# Patient Record
Sex: FEMALE | Race: WHITE | NOT HISPANIC OR LATINO | Employment: FULL TIME | URBAN - METROPOLITAN AREA
[De-identification: names, ages, dates, MRNs, and addresses within clinical notes are randomized per-mention and may not be internally consistent; named-entity substitution may affect disease eponyms.]

---

## 2017-10-05 ENCOUNTER — OFFICE VISIT (OUTPATIENT)
Dept: NEUROLOGY | Facility: CLINIC | Age: 47
End: 2017-10-05

## 2017-10-05 VITALS
OXYGEN SATURATION: 98 % | HEIGHT: 65 IN | BODY MASS INDEX: 35.49 KG/M2 | HEART RATE: 80 BPM | DIASTOLIC BLOOD PRESSURE: 70 MMHG | WEIGHT: 213 LBS | SYSTOLIC BLOOD PRESSURE: 120 MMHG

## 2017-10-05 DIAGNOSIS — R25.1 TREMOR: ICD-10-CM

## 2017-10-05 DIAGNOSIS — G43.C0 PERIODIC HEADACHE SYNDROME, NOT INTRACTABLE: Primary | ICD-10-CM

## 2017-10-05 PROCEDURE — 99245 OFF/OP CONSLTJ NEW/EST HI 55: CPT | Performed by: PSYCHIATRY & NEUROLOGY

## 2017-10-05 RX ORDER — VENLAFAXINE 75 MG/1
TABLET ORAL
Refills: 1 | COMMUNITY
Start: 2017-09-06 | End: 2020-09-28

## 2017-10-05 RX ORDER — GABAPENTIN 300 MG/1
300 CAPSULE ORAL AS NEEDED
COMMUNITY
Start: 2017-10-04 | End: 2020-09-28

## 2017-10-05 RX ORDER — TIZANIDINE 4 MG/1
TABLET ORAL
COMMUNITY
Start: 2017-10-01

## 2017-10-05 RX ORDER — FLUTICASONE PROPIONATE 50 MCG
2 SPRAY, SUSPENSION (ML) NASAL DAILY
COMMUNITY

## 2017-10-05 RX ORDER — TOPIRAMATE 25 MG/1
TABLET ORAL
Qty: 120 TABLET | Refills: 3 | Status: SHIPPED | OUTPATIENT
Start: 2017-10-05 | End: 2020-09-28

## 2017-10-05 RX ORDER — LOSARTAN POTASSIUM 50 MG/1
TABLET ORAL
COMMUNITY
Start: 2017-10-02 | End: 2020-09-28

## 2017-10-05 RX ORDER — LEVONORGESTREL / ETHINYL ESTRADIOL AND ETHINYL ESTRADIOL 150-30(84)
KIT ORAL
COMMUNITY
Start: 2017-10-04

## 2017-10-05 RX ORDER — MECLIZINE HYDROCHLORIDE 25 MG/1
TABLET ORAL
Refills: 1 | COMMUNITY
Start: 2017-08-28 | End: 2020-09-28

## 2017-10-05 RX ORDER — ALBUTEROL SULFATE 90 UG/1
2 AEROSOL, METERED RESPIRATORY (INHALATION) EVERY 4 HOURS PRN
COMMUNITY

## 2017-10-05 RX ORDER — LEVOTHYROXINE SODIUM 0.05 MG/1
TABLET ORAL
Refills: 1 | COMMUNITY
Start: 2017-09-18 | End: 2020-09-29 | Stop reason: SDUPTHER

## 2017-10-05 RX ORDER — ATENOLOL 50 MG/1
TABLET ORAL
Refills: 1 | COMMUNITY
Start: 2017-08-28 | End: 2020-09-28

## 2017-10-05 RX ORDER — CETIRIZINE HYDROCHLORIDE 10 MG/1
10 TABLET ORAL DAILY
COMMUNITY
End: 2020-09-28

## 2017-10-05 RX ORDER — NALTREXONE HYDROCHLORIDE AND BUPROPION HYDROCHLORIDE 8; 90 MG/1; MG/1
TABLET, EXTENDED RELEASE ORAL
Refills: 3 | COMMUNITY
Start: 2017-08-12 | End: 2020-09-28

## 2017-10-05 NOTE — PROGRESS NOTES
Subjective:   .  Chief Complaint   Patient presents with   • Dizziness       Patient ID: Michelle Davies is a 47 y.o. female.    History of Present Illness     47 y.o. woman referred by Monika Villagomez for dizziness, HA and tremors.    HA frequency is daily.  Constantly present located behind eyes.  Quality is pressure and throbbing.  Worse 8/10 and best 4/10.  Sensitive to sound, movement.  Concurrent sx of dizziness and nausea.  Riding in a car will worsen sx.      Tremors in hands with posture.  Right greater than left.  Trouble with writing and holding a heavy glass.  Sx started in last year.     Reviewed Monika Villagomez's records:    MRI Brain, my review of films, 9/12/17 few scattered T2 signal abnl  Labs - CBC, CMP, U/A, TSH, B12, Rf, CRP, WOO, - NCS    The following portions of the patient's history were reviewed and updated as appropriate:   She  has a past medical history of Anxiety; Arthritis; Depression; Hyperlipidemia; Hypertension; and Migraine.  She  does not have any pertinent problems on file.  She  has a past surgical history that includes Reduction mammaplasty; Gastric bypass; Cholecystectomy; and Lumbar disc surgery.  Her family history includes Alcohol abuse in her maternal grandfather; Cancer in her maternal grandmother; Hypertension in her mother.  She  reports that she has never smoked. She has never used smokeless tobacco. She reports that she drinks alcohol. She reports that she does not use illicit drugs.  Current Outpatient Prescriptions   Medication Sig Dispense Refill   • albuterol (PROVENTIL HFA;VENTOLIN HFA) 108 (90 Base) MCG/ACT inhaler Inhale 2 puffs Every 4 (Four) Hours As Needed for Wheezing.     • atenolol (TENORMIN) 50 MG tablet TAKE 1 TABLET BY MOUTH DAILY  1   • cetirizine (zyrTEC) 10 MG tablet Take 10 mg by mouth Daily.     • CONTRAVE 8-90 MG tablet TAKE 2 TABLETS BY MOUTH TWICE A DAY  3   • fluticasone (FLONASE) 50 MCG/ACT nasal spray 2 sprays into each nostril Daily.     • gabapentin  (NEURONTIN) 300 MG capsule Take 300 mg by mouth As Needed.     • Levonorgest-Eth Estrad 91-Day 0.15-0.03 &0.01 MG tablet      • levothyroxine (SYNTHROID, LEVOTHROID) 50 MCG tablet TAKE ONE TABLET BY MOUTH ONCE A DAY  1   • losartan (COZAAR) 50 MG tablet      • meclizine (ANTIVERT) 25 MG tablet TAKE 1 TABLET BY MOUTH 4 TIMES DAILY AS NEEDED FOR VERTIGO  1   • Multiple Vitamins-Minerals (MULTIVITAMIN ADULT PO) Take  by mouth.     • tiZANidine (ZANAFLEX) 4 MG tablet      • venlafaxine (EFFEXOR) 75 MG tablet TAKE 2 TABLETS AT BEDTIME  1     No current facility-administered medications for this visit.      No current outpatient prescriptions on file prior to visit.     No current facility-administered medications on file prior to visit.      She has No Known Allergies..    Review of Systems   Constitutional: Positive for fatigue. Negative for activity change and unexpected weight change.   HENT: Negative for tinnitus and trouble swallowing.    Eyes: Negative for photophobia and visual disturbance.   Respiratory: Negative for apnea, cough and choking.    Cardiovascular: Negative for leg swelling.   Gastrointestinal: Negative for nausea and vomiting.   Endocrine: Negative for cold intolerance and heat intolerance.   Genitourinary: Negative for difficulty urinating, frequency, menstrual problem and urgency.   Musculoskeletal: Positive for arthralgias. Negative for back pain, gait problem, myalgias and neck pain.   Skin: Negative for color change and rash.   Allergic/Immunologic: Negative for immunocompromised state.   Neurological: Positive for dizziness, tremors and headaches. Negative for seizures, syncope, facial asymmetry, speech difficulty, weakness, light-headedness and numbness.   Hematological: Negative for adenopathy. Does not bruise/bleed easily.   Psychiatric/Behavioral: Positive for decreased concentration. Negative for behavioral problems, confusion, hallucinations and sleep disturbance.     "    Objective:  Vitals:    10/05/17 1142   BP: 120/70   Pulse: 80   SpO2: 98%   Weight: 213 lb (96.6 kg)   Height: 65\" (165.1 cm)       Neurologic Exam     Mental Status   Oriented to person, place, and time.   Registration: recalls 3 of 3 objects. Recall at 5 minutes: recalls 3 of 3 objects. Follows 3 step commands.   Attention: normal. Concentration: normal.   Speech: speech is normal   Level of consciousness: alert  Knowledge: good and consistent with education.   Able to name object. Able to read. Able to repeat. Able to write. Normal comprehension.     Cranial Nerves     CN II   Visual fields full to confrontation.   Visual acuity: normal  Right visual field deficit: none  Left visual field deficit: none     CN III, IV, VI   Pupils are equal, round, and reactive to light.  Extraocular motions are normal.   Right pupil: Shape: regular. Reactivity: brisk. Consensual response: intact.   Left pupil: Shape: regular. Reactivity: brisk. Consensual response: intact.   Nystagmus: none   Diplopia: none  Ophthalmoparesis: none  Upgaze: normal  Downgaze: normal  Conjugate gaze: present  Vestibulo-ocular reflex: present    CN V   Facial sensation intact.   Right corneal reflex: normal  Left corneal reflex: normal    CN VII   Right facial weakness: none  Left facial weakness: none    CN VIII   Hearing: intact    CN IX, X   Palate: symmetric  Right gag reflex: normal  Left gag reflex: normal    CN XI   Right sternocleidomastoid strength: normal  Left sternocleidomastoid strength: normal    CN XII   Tongue: not atrophic  Fasciculations: absent  Tongue deviation: none    Motor Exam   Muscle bulk: normal  Overall muscle tone: normal  Right arm tone: normal  Left arm tone: normal  Right leg tone: normal  Left leg tone: normal    Strength   Strength 5/5 throughout.     Sensory Exam   Light touch normal.   Vibration normal.   Proprioception normal.   Pinprick normal.     Gait, Coordination, and Reflexes     Gait  Gait: " normal    Coordination   Romberg: negative  Finger to nose coordination: normal  Heel to shin coordination: normal  Tandem walking coordination: normal    Tremor   Resting tremor: absent  Intention tremor: present  Action tremor: left arm and right arm    Reflexes   Reflexes 2+ except as noted.       Physical Exam   Constitutional: She is oriented to person, place, and time. Vital signs are normal. She appears well-developed and well-nourished.   HENT:   Head: Normocephalic and atraumatic.   Eyes: EOM and lids are normal. Pupils are equal, round, and reactive to light.   Fundoscopic exam:       The right eye shows no exudate, no hemorrhage and no papilledema. The right eye shows venous pulsations.        The left eye shows no exudate, no hemorrhage and no papilledema. The left eye shows venous pulsations.   Neck: Normal range of motion and phonation normal. Normal carotid pulses present. Carotid bruit is not present. No thyroid mass and no thyromegaly present.   Cardiovascular: Normal rate, regular rhythm and normal heart sounds.    Pulmonary/Chest: Effort normal.   Neurological: She is oriented to person, place, and time. She has normal strength. She has a normal Finger-Nose-Finger Test, a normal Heel to Shin Test, a normal Romberg Test and a normal Tandem Gait Test. Gait normal.   Skin: Skin is warm and dry.   Psychiatric: She has a normal mood and affect. Her speech is normal.   Nursing note and vitals reviewed.      Assessment/Plan:       Problems Addressed this Visit        Cardiovascular and Mediastinum    Periodic headache syndrome, not intractable - Primary     Headaches are worsening.  Medication changes per orders.     Start TPM tiration              Relevant Medications    atenolol (TENORMIN) 50 MG tablet    gabapentin (NEURONTIN) 300 MG capsule    tiZANidine (ZANAFLEX) 4 MG tablet    venlafaxine (EFFEXOR) 75 MG tablet       Other    Tremor     Newly identified   Start TPM

## 2020-09-28 ENCOUNTER — OFFICE VISIT (OUTPATIENT)
Dept: ENDOCRINOLOGY | Facility: CLINIC | Age: 50
End: 2020-09-28

## 2020-09-28 ENCOUNTER — LAB (OUTPATIENT)
Dept: LAB | Facility: HOSPITAL | Age: 50
End: 2020-09-28

## 2020-09-28 VITALS
SYSTOLIC BLOOD PRESSURE: 132 MMHG | WEIGHT: 210.8 LBS | TEMPERATURE: 97.3 F | RESPIRATION RATE: 16 BRPM | HEART RATE: 99 BPM | DIASTOLIC BLOOD PRESSURE: 90 MMHG | BODY MASS INDEX: 35.12 KG/M2 | HEIGHT: 65 IN | OXYGEN SATURATION: 98 %

## 2020-09-28 DIAGNOSIS — R53.82 CHRONIC FATIGUE: ICD-10-CM

## 2020-09-28 DIAGNOSIS — D35.2 PITUITARY MICROADENOMA (HCC): Primary | ICD-10-CM

## 2020-09-28 DIAGNOSIS — E03.8 OTHER SPECIFIED HYPOTHYROIDISM: ICD-10-CM

## 2020-09-28 LAB
25(OH)D3 SERPL-MCNC: 29.8 NG/ML (ref 30–100)
ALBUMIN SERPL-MCNC: 4.3 G/DL (ref 3.5–5.2)
ALBUMIN/GLOB SERPL: 1.3 G/DL
ALP SERPL-CCNC: 49 U/L (ref 39–117)
ALT SERPL W P-5'-P-CCNC: 12 U/L (ref 1–33)
ANION GAP SERPL CALCULATED.3IONS-SCNC: 13 MMOL/L (ref 5–15)
AST SERPL-CCNC: 12 U/L (ref 1–32)
BILIRUB SERPL-MCNC: 0.2 MG/DL (ref 0–1.2)
BUN SERPL-MCNC: 18 MG/DL (ref 6–20)
BUN/CREAT SERPL: 19.4 (ref 7–25)
CALCIUM SPEC-SCNC: 9.8 MG/DL (ref 8.6–10.5)
CHLORIDE SERPL-SCNC: 102 MMOL/L (ref 98–107)
CO2 SERPL-SCNC: 22 MMOL/L (ref 22–29)
CREAT SERPL-MCNC: 0.93 MG/DL (ref 0.57–1)
DEPRECATED RDW RBC AUTO: 44.2 FL (ref 37–54)
ERYTHROCYTE [DISTWIDTH] IN BLOOD BY AUTOMATED COUNT: 14.6 % (ref 12.3–15.4)
GFR SERPL CREATININE-BSD FRML MDRD: 64 ML/MIN/1.73
GLOBULIN UR ELPH-MCNC: 3.4 GM/DL
GLUCOSE SERPL-MCNC: 98 MG/DL (ref 65–99)
HCT VFR BLD AUTO: 42.1 % (ref 34–46.6)
HGB BLD-MCNC: 13.4 G/DL (ref 12–15.9)
MCH RBC QN AUTO: 26.2 PG (ref 26.6–33)
MCHC RBC AUTO-ENTMCNC: 31.8 G/DL (ref 31.5–35.7)
MCV RBC AUTO: 82.4 FL (ref 79–97)
PLATELET # BLD AUTO: 523 10*3/MM3 (ref 140–450)
PMV BLD AUTO: 9.3 FL (ref 6–12)
POTASSIUM SERPL-SCNC: 4.1 MMOL/L (ref 3.5–5.2)
PROT SERPL-MCNC: 7.7 G/DL (ref 6–8.5)
RBC # BLD AUTO: 5.11 10*6/MM3 (ref 3.77–5.28)
SODIUM SERPL-SCNC: 137 MMOL/L (ref 136–145)
T4 FREE SERPL-MCNC: 0.9 NG/DL (ref 0.93–1.7)
TSH SERPL DL<=0.05 MIU/L-ACNC: 1.21 UIU/ML (ref 0.27–4.2)
WBC # BLD AUTO: 9.87 10*3/MM3 (ref 3.4–10.8)

## 2020-09-28 PROCEDURE — 99214 OFFICE O/P EST MOD 30 MIN: CPT | Performed by: INTERNAL MEDICINE

## 2020-09-28 PROCEDURE — 85027 COMPLETE CBC AUTOMATED: CPT | Performed by: INTERNAL MEDICINE

## 2020-09-28 PROCEDURE — 82306 VITAMIN D 25 HYDROXY: CPT | Performed by: INTERNAL MEDICINE

## 2020-09-28 PROCEDURE — 84439 ASSAY OF FREE THYROXINE: CPT | Performed by: INTERNAL MEDICINE

## 2020-09-28 PROCEDURE — 80053 COMPREHEN METABOLIC PANEL: CPT | Performed by: INTERNAL MEDICINE

## 2020-09-28 PROCEDURE — 84443 ASSAY THYROID STIM HORMONE: CPT | Performed by: INTERNAL MEDICINE

## 2020-09-28 RX ORDER — TOPIRAMATE 100 MG/1
100 CAPSULE, EXTENDED RELEASE ORAL DAILY
COMMUNITY

## 2020-09-28 RX ORDER — ASPIRIN 81 MG/1
81 TABLET, CHEWABLE ORAL DAILY
COMMUNITY

## 2020-09-28 NOTE — PROGRESS NOTES
"Chief Complaint   Patient presents with   • Establish Care   • Pituitary Problem     tumor   • Hypothyroidism        Referring Provider  Monika Villagomez APRN HPI   Michelle Davies is a 50 y.o. female had concerns including Establish Care, Pituitary Problem (tumor), and Hypothyroidism.     Patient here today as a follow-up.  I saw her in the past at my previous office.  We have not received any of those records.    She was incidentally diagnosed with a pituitary microadenoma in 2017 after evaluation was done for headaches/migraines.  Last MRI we have on record is from April 4 of 2019 showing a 7 x 5 x 8 mm pituitary adenoma, possible cyst, similar in appearance to previous year.    Patient had a repeat MRI in June which she reported was unchanged.  We have not received that report.    Hormonal evaluation was done at the time and this was found to be a nonfunctioning pituitary microadenoma.    She has a history of Graves' disease in her late 20s.  Was on Tapazole for some period of time and then transition to levothyroxine therapy after she developed hypothyroidism.  She is on levothyroxine 50 mcg daily which she takes for seeing the morning, empty stomach, separate from all other medications.    In recent months she complains of worsening fatigue.  Reports that sleep is poor.  She has a history of PTSD due to some childhood trauma and recently moved back in with her mother (who was related to the childhood trauma) and this is caused her sleep to worsen.  She also wonders if her Effexor dose needs modification.    Is wondering if her \"hormones\" are abnormal.  Patient takes Seasonique and has regular menses while taking.  If she is off the Seasonique reports that she has some spotting.  Was started on this medication for heavy menses and is considering discontinuation.    Past Medical History:   Diagnosis Date   • Anemia    • Anxiety    • Arthritis    • Asthma    • Brain tumor (benign) (CMS/HCC)    • Depression    • " Hyperlipidemia    • Hypertension    • Hypothyroidism     History of Graves disease age 30, on methimazole, then developed hypothyroidism, no history of TRAORE   • Migraine    • Pituitary adenoma (CMS/HCC)      Past Surgical History:   Procedure Laterality Date   • CHOLECYSTECTOMY     • GASTRIC BYPASS     • LUMBAR DISC SURGERY     • MANDIBLE SURGERY  12/1989    reconstruction surgery   • REDUCTION MAMMAPLASTY        Family History   Problem Relation Age of Onset   • Hypertension Mother    • Arthritis Mother    • Hyperlipidemia Mother    • Depression Mother    • Anxiety disorder Mother    • Obesity Mother    • Thyroid disease Mother    • Cancer Maternal Grandmother         Ovarian   • Alcohol abuse Maternal Grandfather    • Anxiety disorder Father    • Depression Sister    • Anxiety disorder Brother    • Drug abuse Brother    • Obesity Paternal Aunt    • Obesity Paternal Uncle       Social History     Socioeconomic History   • Marital status:      Spouse name: Not on file   • Number of children: Not on file   • Years of education: Not on file   • Highest education level: Not on file   Tobacco Use   • Smoking status: Never Smoker   • Smokeless tobacco: Never Used   Substance and Sexual Activity   • Alcohol use: Not Currently     Comment: social   • Drug use: No      No Known Allergies   Current Outpatient Medications on File Prior to Visit   Medication Sig Dispense Refill   • albuterol (PROVENTIL HFA;VENTOLIN HFA) 108 (90 Base) MCG/ACT inhaler Inhale 2 puffs Every 4 (Four) Hours As Needed for Wheezing.     • aspirin 81 MG chewable tablet Chew 81 mg Daily.     • fluticasone (FLONASE) 50 MCG/ACT nasal spray 2 sprays into each nostril Daily.     • Levonorgest-Eth Estrad 91-Day 0.15-0.03 &0.01 MG tablet      • levothyroxine (SYNTHROID, LEVOTHROID) 50 MCG tablet TAKE ONE TABLET BY MOUTH ONCE A DAY  1   • Multiple Vitamins-Minerals (MULTIVITAMIN ADULT PO) Take  by mouth.     • tiZANidine (ZANAFLEX) 4 MG tablet      •  "Topiramate ER (Trokendi XR) 100 MG capsule sustained-release 24 hr Take 100 mg by mouth Daily.     • Venlafaxine HCl (EFFEXOR XR PO) Take 225 mg by mouth Daily.     • [DISCONTINUED] atenolol (TENORMIN) 50 MG tablet TAKE 1 TABLET BY MOUTH DAILY  1   • [DISCONTINUED] cetirizine (zyrTEC) 10 MG tablet Take 10 mg by mouth Daily.     • [DISCONTINUED] CONTRAVE 8-90 MG tablet TAKE 2 TABLETS BY MOUTH TWICE A DAY  3   • [DISCONTINUED] gabapentin (NEURONTIN) 300 MG capsule Take 300 mg by mouth As Needed.     • [DISCONTINUED] losartan (COZAAR) 50 MG tablet      • [DISCONTINUED] meclizine (ANTIVERT) 25 MG tablet TAKE 1 TABLET BY MOUTH 4 TIMES DAILY AS NEEDED FOR VERTIGO  1   • [DISCONTINUED] topiramate (TOPAMAX) 25 MG tablet Take one tablet twice a day for one week, then two tablets twice a day 120 tablet 3   • [DISCONTINUED] venlafaxine (EFFEXOR) 75 MG tablet TAKE 2 TABLETS AT BEDTIME  1     No current facility-administered medications on file prior to visit.         Review of Systems   Constitutional: Positive for fatigue.   HENT: Positive for ear pain and swollen glands.    Eyes: Positive for visual disturbance.   Cardiovascular: Positive for palpitations.   Gastrointestinal: Positive for abdominal pain and constipation.   Endocrine: Positive for polydipsia.   Genitourinary: Negative.    Musculoskeletal: Positive for arthralgias.   Skin: Negative.    Allergic/Immunologic: Negative.    Neurological: Positive for dizziness, numbness, headache and confusion.   Hematological: Negative.    Psychiatric/Behavioral: Positive for sleep disturbance and depressed mood.      ROS was reviewed and verified. All other ROS negative.    /90   Pulse 99   Temp 97.3 °F (36.3 °C) (Infrared)   Resp 16   Ht 165.1 cm (65\")   Wt 95.6 kg (210 lb 12.8 oz)   SpO2 98%   BMI 35.08 kg/m²      Physical Exam    Constitutional:  well developed; well nourished  no acute distress  obese - Body mass index is 35.08 kg/m².   ENT/Thyroid: no " "thyromegaly  no palpable nodules   Eyes: EOM intact  Conjunctiva: clear   Respiratory:  breathing is unlabored  clear to auscultation bilaterally   Cardiovascular:  regular rate and rhythm, S1, S2 normal, no murmur, click, rub or gallop   Chest:  Not performed.   Abdomen: Not performed.   : Not performed.   Musculoskeletal: negative findings:  ROM of all joints is normal, no deformities present   Skin: dry and warm   Neuro: normal without focal findings and mental status, speech normal, alert and oriented x3   Psych: oriented to time, place and person, mood and affect are within normal limits     Labs/Imaging  CMP  Lab Results   Component Value Date    GLUCOSE 98 11/05/2015    BUN 17 11/05/2015    CREATININE 0.7 11/05/2015    K 4.2 11/05/2015    CO2 28 11/05/2015    CALCIUM 10.5 (H) 11/05/2015 4/4/2019 pituitary adenoma 7 x 5 x 8 mm, similar in appearance to previous year.  No change, possible cyst.      Assessment and Plan    Michelle was seen today for establish care, pituitary problem and hypothyroidism.    Diagnoses and all orders for this visit:    Pituitary microadenoma (CMS/HCC)  History of incidentally noted pituitary microadenoma most recent MRI from June.  We have not received that report.  Records will be requested.  Per history, this is a nonfunctioning pituitary microadenoma.  -     Comprehensive Metabolic Panel  -     CBC (No Diff)    Other specified hypothyroidism  Clinically euthyroid on levothyroxine 50 mcg daily.  History of Graves' disease in her 20s but did not receive TRAORE.  Subsequently developed hypothyroidism.  Recheck TFTs and titrate levothyroxine if needed for TSH in the mid to lower range of normal.  -     T4, Free  -     TSH    Chronic fatigue  Suspect poor sleep is the primary cause of fatigue. Checking TFTs as above.   Check metabolic panel, CBC, vitamin D.  Patient wonders about her \"hormones\".  While on OCPs, estrogen levels should not be checked.  She may be perimenopausal.  " Discussed considering transition off of OCPs.  Once she is 12 months without a menstrual cycle, menopause is confirmed.  Patient reports in recent past having some spotting when off OCPs, suggesting possible perimenopause which may affect mood and sleep.  -     Vitamin D 25 Hydroxy         Return in about 1 year (around 9/28/2021) for next scheduled follow up. The patient was instructed to contact the clinic with any interval questions or concerns.    Belem Monahan, DO   Endocrinologist    Please note that portions of this document were completed with a voice recognition program. Efforts were made to edit the dictations, but occasionally words are mis-transcribed.

## 2020-09-29 ENCOUNTER — TELEPHONE (OUTPATIENT)
Dept: ENDOCRINOLOGY | Facility: CLINIC | Age: 50
End: 2020-09-29

## 2020-09-29 DIAGNOSIS — E55.9 VITAMIN D DEFICIENCY: ICD-10-CM

## 2020-09-29 DIAGNOSIS — E03.8 OTHER SPECIFIED HYPOTHYROIDISM: Primary | ICD-10-CM

## 2020-09-29 RX ORDER — MELATONIN
1000 DAILY
Start: 2020-09-29

## 2020-09-29 RX ORDER — LEVOTHYROXINE SODIUM 0.07 MG/1
TABLET ORAL
Qty: 30 TABLET | Refills: 3 | Status: SHIPPED | OUTPATIENT
Start: 2020-09-29 | End: 2020-12-21

## 2020-11-10 ENCOUNTER — RESULTS ENCOUNTER (OUTPATIENT)
Dept: ENDOCRINOLOGY | Facility: CLINIC | Age: 50
End: 2020-11-10

## 2020-11-10 DIAGNOSIS — E03.8 OTHER SPECIFIED HYPOTHYROIDISM: ICD-10-CM

## 2020-12-20 DIAGNOSIS — E03.8 OTHER SPECIFIED HYPOTHYROIDISM: ICD-10-CM

## 2020-12-21 RX ORDER — LEVOTHYROXINE SODIUM 0.07 MG/1
TABLET ORAL
Qty: 90 TABLET | Refills: 1 | Status: SHIPPED | OUTPATIENT
Start: 2020-12-21

## 2024-04-30 ENCOUNTER — LAB (OUTPATIENT)
Dept: LAB | Facility: HOSPITAL | Age: 54
End: 2024-04-30
Payer: COMMERCIAL

## 2024-04-30 ENCOUNTER — OFFICE VISIT (OUTPATIENT)
Dept: FAMILY MEDICINE CLINIC | Facility: CLINIC | Age: 54
End: 2024-04-30
Payer: COMMERCIAL

## 2024-04-30 VITALS
BODY MASS INDEX: 27.01 KG/M2 | HEART RATE: 103 BPM | OXYGEN SATURATION: 98 % | SYSTOLIC BLOOD PRESSURE: 122 MMHG | WEIGHT: 162.1 LBS | HEIGHT: 65 IN | DIASTOLIC BLOOD PRESSURE: 80 MMHG

## 2024-04-30 DIAGNOSIS — Z11.4 SCREENING FOR HIV (HUMAN IMMUNODEFICIENCY VIRUS): ICD-10-CM

## 2024-04-30 DIAGNOSIS — R10.2 PELVIC PAIN IN FEMALE: ICD-10-CM

## 2024-04-30 DIAGNOSIS — R35.0 FREQUENT URINATION: ICD-10-CM

## 2024-04-30 DIAGNOSIS — Z76.89 ENCOUNTER TO ESTABLISH CARE: ICD-10-CM

## 2024-04-30 DIAGNOSIS — E66.3 OVERWEIGHT (BMI 25.0-29.9): ICD-10-CM

## 2024-04-30 DIAGNOSIS — E03.8 OTHER SPECIFIED HYPOTHYROIDISM: ICD-10-CM

## 2024-04-30 DIAGNOSIS — Z76.89 ENCOUNTER TO ESTABLISH CARE: Primary | ICD-10-CM

## 2024-04-30 DIAGNOSIS — Z11.59 ENCOUNTER FOR HCV SCREENING TEST FOR LOW RISK PATIENT: ICD-10-CM

## 2024-04-30 DIAGNOSIS — Z11.3 ROUTINE SCREENING FOR STI (SEXUALLY TRANSMITTED INFECTION): ICD-10-CM

## 2024-04-30 DIAGNOSIS — N30.00 ACUTE CYSTITIS WITHOUT HEMATURIA: ICD-10-CM

## 2024-04-30 LAB
ALBUMIN SERPL-MCNC: 4.5 G/DL (ref 3.5–5.2)
ALBUMIN/GLOB SERPL: 1.4 G/DL
ALP SERPL-CCNC: 72 U/L (ref 39–117)
ALT SERPL W P-5'-P-CCNC: 29 U/L (ref 1–33)
ANION GAP SERPL CALCULATED.3IONS-SCNC: 13.6 MMOL/L (ref 5–15)
AST SERPL-CCNC: 36 U/L (ref 1–32)
BASOPHILS # BLD AUTO: 0.06 10*3/MM3 (ref 0–0.2)
BASOPHILS NFR BLD AUTO: 1.1 % (ref 0–1.5)
BILIRUB BLD-MCNC: NEGATIVE MG/DL
BILIRUB SERPL-MCNC: 0.4 MG/DL (ref 0–1.2)
BUN SERPL-MCNC: 19 MG/DL (ref 6–20)
BUN/CREAT SERPL: 20.7 (ref 7–25)
CALCIUM SPEC-SCNC: 10.3 MG/DL (ref 8.6–10.5)
CHLORIDE SERPL-SCNC: 108 MMOL/L (ref 98–107)
CHOLEST SERPL-MCNC: 176 MG/DL (ref 0–200)
CLARITY, POC: ABNORMAL
CO2 SERPL-SCNC: 22.4 MMOL/L (ref 22–29)
COLOR UR: ABNORMAL
CREAT SERPL-MCNC: 0.92 MG/DL (ref 0.57–1)
DEPRECATED RDW RBC AUTO: 43.7 FL (ref 37–54)
EGFRCR SERPLBLD CKD-EPI 2021: 75.5 ML/MIN/1.73
EOSINOPHIL # BLD AUTO: 0.17 10*3/MM3 (ref 0–0.4)
EOSINOPHIL NFR BLD AUTO: 3.1 % (ref 0.3–6.2)
ERYTHROCYTE [DISTWIDTH] IN BLOOD BY AUTOMATED COUNT: 14 % (ref 12.3–15.4)
EXPIRATION DATE: ABNORMAL
GLOBULIN UR ELPH-MCNC: 3.2 GM/DL
GLUCOSE SERPL-MCNC: 95 MG/DL (ref 65–99)
GLUCOSE UR STRIP-MCNC: NEGATIVE MG/DL
HBA1C MFR BLD: 5.7 % (ref 4.8–5.6)
HCT VFR BLD AUTO: 43.1 % (ref 34–46.6)
HDLC SERPL QL: 2.29
HDLC SERPL-MCNC: 77 MG/DL (ref 40–60)
HGB BLD-MCNC: 13.7 G/DL (ref 12–15.9)
HIV 1+2 AB+HIV1 P24 AG SERPL QL IA: NORMAL
IMM GRANULOCYTES # BLD AUTO: 0.01 10*3/MM3 (ref 0–0.05)
IMM GRANULOCYTES NFR BLD AUTO: 0.2 % (ref 0–0.5)
KETONES UR QL: NEGATIVE
LDLC SERPL CALC-MCNC: 86 MG/DL (ref 0–100)
LEUKOCYTE EST, POC: ABNORMAL
LYMPHOCYTES # BLD AUTO: 2.23 10*3/MM3 (ref 0.7–3.1)
LYMPHOCYTES NFR BLD AUTO: 40.3 % (ref 19.6–45.3)
Lab: ABNORMAL
MCH RBC QN AUTO: 26.9 PG (ref 26.6–33)
MCHC RBC AUTO-ENTMCNC: 31.8 G/DL (ref 31.5–35.7)
MCV RBC AUTO: 84.5 FL (ref 79–97)
MONOCYTES # BLD AUTO: 0.46 10*3/MM3 (ref 0.1–0.9)
MONOCYTES NFR BLD AUTO: 8.3 % (ref 5–12)
NEUTROPHILS NFR BLD AUTO: 2.6 10*3/MM3 (ref 1.7–7)
NEUTROPHILS NFR BLD AUTO: 47 % (ref 42.7–76)
NITRITE UR-MCNC: NEGATIVE MG/ML
NRBC BLD AUTO-RTO: 0 /100 WBC (ref 0–0.2)
PH UR: 6 [PH] (ref 5–8)
PLATELET # BLD AUTO: 412 10*3/MM3 (ref 140–450)
PMV BLD AUTO: 9.5 FL (ref 6–12)
POTASSIUM SERPL-SCNC: 4.7 MMOL/L (ref 3.5–5.2)
PROT SERPL-MCNC: 7.7 G/DL (ref 6–8.5)
PROT UR STRIP-MCNC: ABNORMAL MG/DL
RBC # BLD AUTO: 5.1 10*6/MM3 (ref 3.77–5.28)
RBC # UR STRIP: NEGATIVE /UL
SODIUM SERPL-SCNC: 144 MMOL/L (ref 136–145)
SP GR UR: 1.02 (ref 1–1.03)
TRIGL SERPL-MCNC: 70 MG/DL (ref 0–150)
TSH SERPL DL<=0.05 MIU/L-ACNC: 0.28 UIU/ML (ref 0.27–4.2)
UROBILINOGEN UR QL: NORMAL
VLDLC SERPL-MCNC: 13 MG/DL (ref 5–40)
WBC NRBC COR # BLD AUTO: 5.53 10*3/MM3 (ref 3.4–10.8)

## 2024-04-30 PROCEDURE — 86803 HEPATITIS C AB TEST: CPT

## 2024-04-30 PROCEDURE — 86592 SYPHILIS TEST NON-TREP QUAL: CPT

## 2024-04-30 PROCEDURE — 87491 CHLMYD TRACH DNA AMP PROBE: CPT | Performed by: STUDENT IN AN ORGANIZED HEALTH CARE EDUCATION/TRAINING PROGRAM

## 2024-04-30 PROCEDURE — 80061 LIPID PANEL: CPT

## 2024-04-30 PROCEDURE — 81003 URINALYSIS AUTO W/O SCOPE: CPT | Performed by: STUDENT IN AN ORGANIZED HEALTH CARE EDUCATION/TRAINING PROGRAM

## 2024-04-30 PROCEDURE — 80053 COMPREHEN METABOLIC PANEL: CPT

## 2024-04-30 PROCEDURE — 85025 COMPLETE CBC W/AUTO DIFF WBC: CPT

## 2024-04-30 PROCEDURE — 83036 HEMOGLOBIN GLYCOSYLATED A1C: CPT

## 2024-04-30 PROCEDURE — 87591 N.GONORRHOEAE DNA AMP PROB: CPT | Performed by: STUDENT IN AN ORGANIZED HEALTH CARE EDUCATION/TRAINING PROGRAM

## 2024-04-30 PROCEDURE — 99204 OFFICE O/P NEW MOD 45 MIN: CPT | Performed by: STUDENT IN AN ORGANIZED HEALTH CARE EDUCATION/TRAINING PROGRAM

## 2024-04-30 PROCEDURE — 36415 COLL VENOUS BLD VENIPUNCTURE: CPT

## 2024-04-30 PROCEDURE — G0432 EIA HIV-1/HIV-2 SCREEN: HCPCS

## 2024-04-30 PROCEDURE — 87661 TRICHOMONAS VAGINALIS AMPLIF: CPT | Performed by: STUDENT IN AN ORGANIZED HEALTH CARE EDUCATION/TRAINING PROGRAM

## 2024-04-30 PROCEDURE — 84443 ASSAY THYROID STIM HORMONE: CPT

## 2024-04-30 RX ORDER — BUPROPION HYDROCHLORIDE 300 MG/1
300 TABLET ORAL
COMMUNITY
Start: 2021-05-30

## 2024-04-30 RX ORDER — SEMAGLUTIDE 2.4 MG/.75ML
INJECTION, SOLUTION SUBCUTANEOUS
COMMUNITY
Start: 2023-03-30

## 2024-04-30 RX ORDER — ETONOGESTREL 68 MG/1
1 IMPLANT SUBCUTANEOUS
COMMUNITY

## 2024-04-30 RX ORDER — ATORVASTATIN CALCIUM 40 MG/1
1 TABLET, FILM COATED ORAL NIGHTLY
COMMUNITY
Start: 2021-05-30

## 2024-04-30 RX ORDER — UBROGEPANT 100 MG/1
100 TABLET ORAL
COMMUNITY
Start: 2018-01-30

## 2024-04-30 RX ORDER — TRAZODONE HYDROCHLORIDE 50 MG/1
50 TABLET ORAL
COMMUNITY
Start: 2024-03-04

## 2024-04-30 RX ORDER — NITROFURANTOIN 25; 75 MG/1; MG/1
100 CAPSULE ORAL EVERY 12 HOURS SCHEDULED
Qty: 10 CAPSULE | Refills: 0 | Status: SHIPPED | OUTPATIENT
Start: 2024-04-30 | End: 2024-05-05

## 2024-04-30 NOTE — PROGRESS NOTES
"    New Patient Office Visit      Date: 2024   Patient Name: Michelle Davies  : 1970   MRN: [id number]     Chief Complaint:    Chief Complaint   Patient presents with    Urinary Frequency     Cloudy, odor, x6 weeks        History of Present Illness: Michelle Davies is a 53 y.o. female who is here today to establish care.      Subjective      HPI:  Patient presents today to establish care.  Was previously following [facility].    Acute concerns of 6 weeks of increased urinary frequency.  Has cloudy urine with abnormal odor as well.  Denies frequent history of UTIs but has had these in the past, typically when she has a new sexual partner.  Denies any hematuria, suprapubic pain, fever, chills, nausea or vomiting.    Had stroke in .  Occurred in 6 weeks after her COVID booster.  Prior to her stroke she had no cardiac history and no high blood pressure issues.  Still follows w Neurology. Reports she has two brain tumors both of which are benign. One in frontal lobe and one on pituitary. These are monitored with surveillance imaging.   Headaches were getting worse around the time her stroke was found. These have since been controlled w the Trokendi and Ubrelvy.     Mood symptoms are doing well on Effexor 225mg and Wellbutrin 300mg. Has h/o PTSD, anxiety and depression.   Takes Trazodone for sleep which works well.    Since  has had right lower pelvic/suprapubic pain. Describes as a \"twisting\" sensation.  Pain does not seem to exacerbate with menstrual cycles or with bowel movements.  Last menstural cycle was in September.  She reports having this worked up years ago at [facility].  Had ultrasounds and abdominal imaging postop everything was normal.  Eventually she was referred to urologist and had what sounds like bladder emptying study and was told that this was normal as well.    Had colonoscopy in . Has h/o chronic constipation.  Has tried over-the-counter regimens, stimulants, " laxatives as well as Linzess.  She reports the Linzess was helpful but does not want to take medication regularly for this.  Typically has bowel movement every 10 days.  She denies any pain with bowel movements or having to excessively strain.      Has had gastric bypass surgery in past.       Review of Systems:   Negative/not pertinent unless otherwise noted above in HPI.     Past Medical History:   Past Medical History:   Diagnosis Date    Allergic 10/2014    Anemia     Anxiety     Arthritis     Asthma     Brain tumor (benign)     Depression     Hyperlipidemia     Hypertension     Hypothyroidism     History of Graves disease age 30, on methimazole, then developed hypothyroidism, no history of TRAORE    Migraine     Obesity     Pituitary adenoma     Stroke 3/15/2021    Substance abuse     Sober since 2004    Urinary tract infection 2024       Past Surgical History:   Past Surgical History:   Procedure Laterality Date    BARIATRIC SURGERY      CHOLECYSTECTOMY      COLONOSCOPY      GASTRIC BYPASS      LUMBAR DISC SURGERY      MANDIBLE SURGERY  1989    reconstruction surgery    REDUCTION MAMMAPLASTY      SPINE SURGERY  2009    Discectomy       Family History:   Family History   Problem Relation Age of Onset    Hypertension Mother     Arthritis Mother     Hyperlipidemia Mother     Depression Mother     Anxiety disorder Mother     Obesity Mother     Thyroid disease Mother     Alcohol abuse Mother         Estranged    Asthma Mother     Drug abuse Mother     Mental illness Mother     Vision loss Mother         Macular degeneration    Cancer Maternal Grandmother         Never knew -     Alcohol abuse Maternal Grandmother     Alcohol abuse Maternal Grandfather             Anxiety disorder Father     Depression Sister     Anxiety disorder Brother     Drug abuse Brother     Obesity Paternal Aunt     Obesity Paternal Uncle        Social History:   Social History     Socioeconomic  History    Marital status:    Tobacco Use    Smoking status: Never    Smokeless tobacco: Never   Vaping Use    Vaping status: Never Used   Substance and Sexual Activity    Alcohol use: Not Currently     Comment: Socially but haven’t had anything since     Drug use: Not Currently     Types: Oxycodone     Comment: No use since 2004    Sexual activity: Yes     Partners: Male     Birth control/protection: Nexplanon     Comment: Have implant in arm that is        Medications:     Current Outpatient Medications:     aspirin 81 MG chewable tablet, Chew 1 tablet Daily., Disp: , Rfl:     atorvastatin (LIPITOR) 40 MG tablet, Take 1 tablet by mouth Every Night., Disp: , Rfl:     buPROPion XL (WELLBUTRIN XL) 300 MG 24 hr tablet, Take 1 tablet by mouth., Disp: , Rfl:     Etonogestrel (Nexplanon) 68 MG implant subdermal implant, Inject 1 each into the appropriate area of the skin as directed by provider., Disp: , Rfl:     levothyroxine (SYNTHROID, LEVOTHROID) 75 MCG tablet, TAKE 1 TABLET BY MOUTH EVERY DAY, Disp: 90 tablet, Rfl: 1    Multiple Vitamins-Minerals (MULTIVITAMIN ADULT PO), Take  by mouth., Disp: , Rfl:     tiZANidine (ZANAFLEX) 4 MG tablet, , Disp: , Rfl:     traZODone (DESYREL) 50 MG tablet, Take 1 tablet by mouth every night at bedtime., Disp: , Rfl:     Ubrelvy 100 MG tablet, 1 tablet., Disp: , Rfl:     Venlafaxine HCl (EFFEXOR XR PO), Take 225 mg by mouth Daily., Disp: , Rfl:     Wegovy 2.4 MG/0.75ML solution auto-injector, INJECT 2.4 MG EVERY WEEK BY SUBCUTANEOUS ROUTE, Disp: , Rfl:     nitrofurantoin, macrocrystal-monohydrate, (MACROBID) 100 MG capsule, Take 1 capsule by mouth Every 12 (Twelve) Hours for 5 days., Disp: 10 capsule, Rfl: 0    Topiramate ER (Trokendi XR) 100 MG capsule sustained-release 24 hr, Take 100 mg by mouth Daily., Disp: , Rfl:     Allergies:   No Known Allergies    Immunizations:  Immunization History   Administered Date(s) Administered    COVID-19 (MODERNA)  "1st,2nd,3rd Dose Monovalent 01/06/2021, 02/04/2021    Fluzone (or Fluarix & Flulaval for VFC) >6mos 09/29/2020, 11/11/2022    Hep B, Dialysis 01/01/2020    Shingrix 01/11/2023    Tdap 07/22/2020       Tobacco Use: Low Risk  (4/30/2024)    Patient History     Smoking Tobacco Use: Never     Smokeless Tobacco Use: Never     Passive Exposure: Not on file       Social History     Substance and Sexual Activity   Alcohol Use Not Currently    Comment: Socially but haven’t had anything since 2022        Social History     Substance and Sexual Activity   Drug Use Not Currently    Types: Oxycodone    Comment: No use since 1/27/2004      Objective     Physical Exam:  Vital Signs:   Vitals:    04/30/24 0747   BP: 122/80   Pulse: 103   SpO2: 98%   Weight: 73.5 kg (162 lb 1.6 oz)   Height: 165.1 cm (65\")     Body mass index is 26.97 kg/m².    Physical Exam  Constitutional:       Appearance: She is normal weight. She is not ill-appearing.   HENT:      Head: Normocephalic and atraumatic.      Right Ear: Tympanic membrane normal.      Left Ear: Tympanic membrane normal.      Mouth/Throat:      Mouth: Mucous membranes are moist.      Pharynx: Oropharynx is clear. No oropharyngeal exudate or posterior oropharyngeal erythema.   Eyes:      Extraocular Movements: Extraocular movements intact.      Conjunctiva/sclera: Conjunctivae normal.   Cardiovascular:      Rate and Rhythm: Normal rate and regular rhythm.      Heart sounds: Normal heart sounds.   Pulmonary:      Breath sounds: Normal breath sounds. No wheezing or rhonchi.   Abdominal:      General: Abdomen is flat.      Palpations: Abdomen is soft. There is no mass.      Tenderness: There is no abdominal tenderness.      Hernia: No hernia is present.      Comments: Pain reported is not reproducible on palpation   Musculoskeletal:         General: Normal range of motion.      Cervical back: Normal range of motion and neck supple.   Lymphadenopathy:      Cervical: No cervical adenopathy. "   Neurological:      General: No focal deficit present.      Mental Status: She is alert.   Psychiatric:         Mood and Affect: Mood normal.         Thought Content: Thought content normal.       Labs:   Hemoglobin A1C   Date Value Ref Range Status   08/01/2022 6.2 (H) <5.7 % Final     TSH   Date Value Ref Range Status   09/28/2020 1.210 0.270 - 4.200 uIU/mL Final          Assessment / Plan      Assessment/Plan:   Diagnoses and all orders for this visit:    1. Encounter to establish care (Primary)  -     Comprehensive Metabolic Panel; Future  -     CBC & Differential; Future  -     Lipid Panel With / Chol / HDL Ratio; Future  -     Hemoglobin A1c; Future    2. Acute cystitis without hematuria  UA shows +leukocytes  Will send for culture  Tx empirically with Macrobid  Discussed preventative hygiene measures for UTI  -     nitrofurantoin, macrocrystal-monohydrate, (MACROBID) 100 MG capsule; Take 1 capsule by mouth Every 12 (Twelve) Hours for 5 days.  Dispense: 10 capsule; Refill: 0  -     Urine Culture - Urine, Urine, Clean Catch; Future  -     Urine Culture - Urine, Urine, Clean Catch    3. Frequent urination  -     POC Urinalysis Dipstick, Automated  -     Urine Culture - Urine, Urine, Clean Catch; Future  -     Urine Culture - Urine, Urine, Clean Catch    4. Encounter for HCV screening test for low risk patient  -     HCV Antibody Rfx To Qnt PCR; Future    5. Screening for HIV (human immunodeficiency virus)  -     HIV-1/O/2 Ag/Ab w Reflex; Future    6. Routine screening for STI (sexually transmitted infection)  -     RPR; Future  -     Chlamydia trachomatis, Neisseria gonorrhoeae, Trichomonas vaginalis, PCR - Urine, Urine, Clean Catch; Future  -     Chlamydia trachomatis, Neisseria gonorrhoeae, Trichomonas vaginalis, PCR - Urine, Urine, Clean Catch    7. Other specified hypothyroidism  -     TSH Rfx On Abnormal To Free T4; Future    8. Overweight (BMI 25.0-29.9)  -     Comprehensive Metabolic Panel; Future  -      Lipid Panel With / Chol / HDL Ratio; Future  -     Hemoglobin A1c; Future    9. Pelvic pain in female  Patient reports right-sided pelvic pain since 2017.  She has had this worked up in the past (years ago) but was living in [place] at the time.  TOMMIE form was faxed to [facility] so I may review records.  Will likely plan to proceed with TVUS vs CT A/P based on location today although her abd exam is benign today and I am unable to reproduce the pain on palpation.    Michelle Davies voices understanding and acceptance of this advice and will call back with any further questions or concerns. AVS with preventive healthcare tips printed for patient.         Follow Up:   Return for FU in 3-4 weeks for annual and pap 30 min.        Judith Ochoa DO  [facility] [place] [address]

## 2024-05-01 LAB
HCV AB SERPL QL IA: NORMAL
HCV IGG SERPL QL IA: NON REACTIVE
RPR SER QL: NORMAL

## 2024-05-02 ENCOUNTER — PATIENT ROUNDING (BHMG ONLY) (OUTPATIENT)
Dept: FAMILY MEDICINE CLINIC | Facility: CLINIC | Age: 54
End: 2024-05-02
Payer: COMMERCIAL

## 2024-05-02 LAB — BACTERIA SPEC AEROBE CULT: NO GROWTH

## 2024-05-03 LAB
C TRACH RRNA SPEC QL NAA+PROBE: NEGATIVE
N GONORRHOEA RRNA SPEC QL NAA+PROBE: NEGATIVE
T VAGINALIS RRNA SPEC QL NAA+PROBE: NEGATIVE

## 2024-05-16 NOTE — PROGRESS NOTES
Female Physical Note      Date: 2024   Patient Name: Michelle Davies  : 1970   MRN: [id number]     Chief Complaint:    Chief Complaint   Patient presents with    Annual Exam     With pap - No chief complaint        History of Present Illness: Michelle Davies is a 53 y.o. female who is here today for their annual health maintenance and physical.    HPI  Has had some increased stressors at work recently.  Coping okay for now.    Patient was put on Macrobid course after last appointment for UTI symptoms.  Culture returned negative.  She did complete course of antibiotics.  Still having urinary urgency. Odor is better.  Has a weak stream but feels like she can completely empty.  Still having regular menses.    Had mammogram last year.  Would like to get mammogram every 2 years. No known family h/o breast cancer.  She does self breast exams at home.  No concerns.    Colonoscopy in . Colonoscopy was done at [facility] in January. Need records for this.    Diet is described as not healthy. Can often skip meals. When she does eat its healthy choices. Loves vegetables, will often eat just a protein and veggie. Does not get regular exercise.    Had 1 dose of shingles and does not want to get booster.       Subjective      Review of Systems:   Review of Systems   Genitourinary:  Positive for frequency and urgency. Negative for dysuria, vaginal bleeding and vaginal discharge.   Psychiatric/Behavioral:  Positive for stress.        Past Medical History, Social History, Family History and Care Team were all reviewed with patient and updated as appropriate.     Medications:     Current Outpatient Medications:     aspirin 81 MG chewable tablet, Chew 1 tablet Daily., Disp: , Rfl:     atorvastatin (LIPITOR) 40 MG tablet, Take 1 tablet by mouth Every Night., Disp: , Rfl:     buPROPion XL (WELLBUTRIN XL) 300 MG 24 hr tablet, Take 1 tablet by mouth., Disp: , Rfl:     Etonogestrel (Nexplanon) 68 MG implant subdermal  implant, Inject 1 each into the appropriate area of the skin as directed by provider., Disp: , Rfl:     levothyroxine (SYNTHROID, LEVOTHROID) 75 MCG tablet, TAKE 1 TABLET BY MOUTH EVERY DAY, Disp: 90 tablet, Rfl: 1    Multiple Vitamins-Minerals (MULTIVITAMIN ADULT PO), Take  by mouth., Disp: , Rfl:     tiZANidine (ZANAFLEX) 4 MG tablet, , Disp: , Rfl:     Topiramate ER (Trokendi XR) 100 MG capsule sustained-release 24 hr, Take 1 capsule by mouth Daily., Disp: , Rfl:     traZODone (DESYREL) 50 MG tablet, Take 1 tablet by mouth every night at bedtime., Disp: , Rfl:     Ubrelvy 100 MG tablet, 1 tablet., Disp: , Rfl:     Venlafaxine HCl (EFFEXOR XR PO), Take 225 mg by mouth Daily., Disp: , Rfl:     Allergies:   No Known Allergies    Immunizations:  Td/Tdap(Booster Q 10 yrs):  7/22/2020  Flu (Yearly):  Annually  Pneumonia: Not indicated  Immunization History   Administered Date(s) Administered    COVID-19 (MODERNA) 1st,2nd,3rd Dose Monovalent 01/06/2021, 02/04/2021    Fluzone (or Fluarix & Flulaval for VFC) >6mos 09/29/2020, 11/11/2022    Hep B, Dialysis 01/01/2020    Shingrix 01/11/2023    Tdap 07/22/2020       Colorectal Screening:   Done 2021 but pt reports poor prep. Need records.   Pap:  Today   Mammogram:  Done 2023, prefers every other year screening     CT for Smoker (Age 50-80, 20 pk yr):  Never smoker  Bone Density/DEXA (Age 65 or high risk): DEXA scan to be done at 65  Hep C (Age 18-79 once):  Previously negative  HIV (Age 15-65 once): Previously negative  A1c: Done  Lipid panel: Done  The ASCVD Risk score (Sigifredo RAMIREZ, et al., 2019) failed to calculate for the following reasons:    The patient has a prior MI or stroke diagnosis      Tobacco Use: Low Risk  (5/17/2024)    Patient History     Smoking Tobacco Use: Never     Smokeless Tobacco Use: Never     Passive Exposure: Never       Social History     Substance and Sexual Activity   Alcohol Use Not Currently    Comment: Socially but haven’t had anything since  "2022        Social History     Substance and Sexual Activity   Drug Use Not Currently    Types: Oxycodone    Comment: No use since 1/27/2004        Diet/Physical activity: see HPI    Sexual Health: Not using contraception, not attempting pregnancy   Menstrual Cycles: regular, last menstrual cycle: unknown    PHQ-2 Depression Screening  PHQ-9 Depression Screening  Little interest or pleasure in doing things? 1-->several days   Feeling down, depressed, or hopeless? 1-->several days   Trouble falling or staying asleep, or sleeping too much? 1-->several days   Feeling tired or having little energy? 3-->nearly every day   Poor appetite or overeating? 0-->not at all   Feeling bad about yourself - or that you are a failure or have let yourself or your family down? 1-->several days   Trouble concentrating on things, such as reading the newspaper or watching television? 1-->several days   Moving or speaking so slowly that other people could have noticed? Or the opposite - being so fidgety or restless that you have been moving around a lot more than usual? 0-->not at all   Thoughts that you would be better off dead, or of hurting yourself in some way? 0-->not at all   PHQ-9 Total Score 8   If you checked off any problems, how difficult have these problems made it for you to do your work, take care of things at home, or get along with other people? extremely difficult       PHQ-9 Total Score: 8     Objective     Physical Exam:  Vital Signs:   Vitals:    05/17/24 0809   BP: 120/82   BP Location: Left arm   Patient Position: Sitting   Cuff Size: Adult   Pulse: 90   SpO2: 97%   Weight: 74.4 kg (164 lb)   Height: 165.1 cm (65\")   PainSc:   3     Body mass index is 27.29 kg/m².     Physical Exam  Constitutional:       Appearance: She is normal weight. She is not ill-appearing.   HENT:      Head: Normocephalic and atraumatic.      Right Ear: Tympanic membrane normal.      Left Ear: Tympanic membrane normal.      Mouth/Throat:      " Mouth: Mucous membranes are moist.      Pharynx: Oropharynx is clear. No oropharyngeal exudate or posterior oropharyngeal erythema.   Eyes:      Extraocular Movements: Extraocular movements intact.      Conjunctiva/sclera: Conjunctivae normal.   Cardiovascular:      Rate and Rhythm: Normal rate and regular rhythm.      Heart sounds: Normal heart sounds.   Pulmonary:      Breath sounds: Normal breath sounds. No wheezing or rhonchi.   Abdominal:      General: Abdomen is flat.      Palpations: Abdomen is soft.      Tenderness: There is no abdominal tenderness.   Genitourinary:     Comments: Labia minora and majora without lesions or rash  Urethra normal, patent, without lesions or discharge  Introitus without lesions or evidence of trauma  Vagina with scant white mucoid discharge, no lesions  Cervix without visible lesions, min friable with Pap collection  Uterus midline without mass or tenderness  Adnexa without mass or tenderness bilaterally  Perineum intact without lesions  Anus without hemorrhoids or lesions    Musculoskeletal:         General: Normal range of motion.      Cervical back: Normal range of motion and neck supple.   Lymphadenopathy:      Cervical: No cervical adenopathy.   Neurological:      General: No focal deficit present.      Mental Status: She is alert.   Psychiatric:         Mood and Affect: Mood normal.         Thought Content: Thought content normal.           Assessment / Plan      Assessment/Plan:   Diagnoses and all orders for this visit:    1. Annual physical exam (Primary)    2. Screening for malignant neoplasm of cervix  -     LIQUID-BASED PAP SMEAR WITH HPV GENOTYPING IF ASCUS (MARY,COR,MAD); Future    3. Microscopic hematuria  Resolved on recheck UA  -     Cancel: Urinalysis With Microscopic - Urine, Clean Catch; Future  -     POC Urinalysis Dipstick, Automated    4. Urinary frequency  Mild prolapse noted on vaginal exam- possible cause. Encouraged kegel exercises, handout was provided.  FU in 2-3 months if symptoms do not improve       Healthcare Maintenance:  Mammo UTD, prefers every other year screening. No famly history. Plan for 2025.  Pap today  NEED records for c-scope from 2021  Discussed healthy diet, regular physical activity  Reviewed PHQ-9 score, likely elevated 2/2 work stressors  Counseling provided based on age appropriate USPSTF guidelines. Preventive counseling and anticipatory guidance discussed on the following topics: nutrition, physical activity, healthy weight, mental health, immunizations, and screenings    BMI is >= 25 and <30. (Overweight) The following options were offered after discussion;: exercise counseling/recommendations, nutrition counseling/recommendations, and information on healthy weight added to patient's after visit summary     Michelle ROBIN Davies voices understanding and acceptance of this advice and will call back with any further questions or concerns. AVS with preventive healthcare tips printed for patient.         Follow Up:   Return in about 1 year (around 5/17/2025) for Annual.          Judith Ochoa DO  [facility] [place] [address]

## 2024-05-17 ENCOUNTER — OFFICE VISIT (OUTPATIENT)
Dept: FAMILY MEDICINE CLINIC | Facility: CLINIC | Age: 54
End: 2024-05-17
Payer: COMMERCIAL

## 2024-05-17 VITALS
WEIGHT: 164 LBS | DIASTOLIC BLOOD PRESSURE: 82 MMHG | OXYGEN SATURATION: 97 % | BODY MASS INDEX: 27.32 KG/M2 | HEIGHT: 65 IN | HEART RATE: 90 BPM | SYSTOLIC BLOOD PRESSURE: 120 MMHG

## 2024-05-17 DIAGNOSIS — R35.0 URINARY FREQUENCY: ICD-10-CM

## 2024-05-17 DIAGNOSIS — Z12.4 SCREENING FOR MALIGNANT NEOPLASM OF CERVIX: ICD-10-CM

## 2024-05-17 DIAGNOSIS — R31.29 MICROSCOPIC HEMATURIA: ICD-10-CM

## 2024-05-17 DIAGNOSIS — Z00.00 ANNUAL PHYSICAL EXAM: Primary | ICD-10-CM

## 2024-05-17 LAB
BILIRUB BLD-MCNC: NEGATIVE MG/DL
CLARITY, POC: CLEAR
COLOR UR: YELLOW
EXPIRATION DATE: NORMAL
GLUCOSE UR STRIP-MCNC: NEGATIVE MG/DL
KETONES UR QL: NEGATIVE
LEUKOCYTE EST, POC: NEGATIVE
Lab: NORMAL
NITRITE UR-MCNC: NEGATIVE MG/ML
PH UR: 6 [PH] (ref 5–8)
PROT UR STRIP-MCNC: NEGATIVE MG/DL
RBC # UR STRIP: NEGATIVE /UL
SP GR UR: 1.02 (ref 1–1.03)
UROBILINOGEN UR QL: NORMAL

## 2024-05-17 PROCEDURE — 81003 URINALYSIS AUTO W/O SCOPE: CPT | Performed by: STUDENT IN AN ORGANIZED HEALTH CARE EDUCATION/TRAINING PROGRAM

## 2024-05-17 PROCEDURE — 99396 PREV VISIT EST AGE 40-64: CPT | Performed by: STUDENT IN AN ORGANIZED HEALTH CARE EDUCATION/TRAINING PROGRAM

## 2024-05-19 DIAGNOSIS — E03.8 OTHER SPECIFIED HYPOTHYROIDISM: ICD-10-CM

## 2024-05-20 RX ORDER — LEVOTHYROXINE SODIUM 0.07 MG/1
TABLET ORAL
Qty: 90 TABLET | Refills: 1 | OUTPATIENT
Start: 2024-05-20

## 2024-05-20 NOTE — TELEPHONE ENCOUNTER
Rx Refill Note  Requested Prescriptions     Pending Prescriptions Disp Refills    levothyroxine (SYNTHROID, LEVOTHROID) 75 MCG tablet 90 tablet 1     Sig: TAKE 1 TABLET BY MOUTH EVERY DAY          Last office visit with prescribing clinician: Visit date not found     Next office visit with prescribing clinician: Visit date not found         Ivis Delgado MA  05/20/24, 15:18 EDT

## 2024-05-22 LAB — REF LAB TEST METHOD: NORMAL

## 2024-05-28 DIAGNOSIS — E03.8 OTHER SPECIFIED HYPOTHYROIDISM: ICD-10-CM

## 2024-05-28 RX ORDER — LEVOTHYROXINE SODIUM 0.07 MG/1
TABLET ORAL
Qty: 30 TABLET | Refills: 0 | Status: SHIPPED | OUTPATIENT
Start: 2024-05-28

## 2024-06-10 NOTE — PROGRESS NOTES
Chief Complaint   Patient presents with    Urinary Frequency     X1 week with burning       HPI:  Michelle Davies is a 53 y.o. female who presents today for UTI symptoms.     1 week h/o burning with urination, increased frequency. Second episode since she has had new sexual partner. Symptoms similar to previous UTI's. Tried azo over the weekend which helped but symptoms returned. Over the last few days she's not been feeling 100%, low energy. Denies fever, chills, nausea, vomiting. Has had a slight dry cough.     PE:  Vitals:    06/11/24 0743   BP: 132/84   Pulse: 72   SpO2: 97%      Body mass index is 28.89 kg/m².    Gen Appearance: NAD  HEENT: Normocephalic, mild maxillary sinus TTP, no oropharyngeal exudates  Heart: RRR, normal S1 and S2, no murmur  Lungs: CTA b/l, no wheezing, no crackles  MSK: Moves all extremities well, normal gait, no peripheral edema  Neuro: No focal deficits    Current Outpatient Medications   Medication Sig Dispense Refill    aspirin 81 MG chewable tablet Chew 1 tablet Daily.      atorvastatin (LIPITOR) 40 MG tablet Take 1 tablet by mouth Every Night.      buPROPion XL (WELLBUTRIN XL) 300 MG 24 hr tablet Take 1 tablet by mouth.      Etonogestrel (Nexplanon) 68 MG implant subdermal implant Inject 1 each into the appropriate area of the skin as directed by provider.      levothyroxine (SYNTHROID, LEVOTHROID) 75 MCG tablet TAKE 1 TABLET BY MOUTH EVERY DAY 30 tablet 0    Multiple Vitamins-Minerals (MULTIVITAMIN ADULT PO) Take  by mouth.      tiZANidine (ZANAFLEX) 4 MG tablet       Topiramate ER (Trokendi XR) 100 MG capsule sustained-release 24 hr Take 1 capsule by mouth Daily.      traZODone (DESYREL) 50 MG tablet Take 1 tablet by mouth every night at bedtime.      Ubrelvy 100 MG tablet 1 tablet.      Venlafaxine HCl (EFFEXOR XR PO) Take 225 mg by mouth Daily.      Wegovy 2.4 MG/0.75ML solution auto-injector       nitrofurantoin, macrocrystal-monohydrate, (MACROBID) 100 MG capsule Take 1 capsule  by mouth Every 12 (Twelve) Hours for 5 days. 10 capsule 0     No current facility-administered medications for this visit.        A/P:  Diagnoses and all orders for this visit:    1. Urinary tract infection without hematuria, site unspecified (Primary)  -     POC Urinalysis Dipstick, Automated  -     nitrofurantoin, macrocrystal-monohydrate, (MACROBID) 100 MG capsule; Take 1 capsule by mouth Every 12 (Twelve) Hours for 5 days.  Dispense: 10 capsule; Refill: 0  -     Urinalysis With Microscopic - Urine, Clean Catch; Future  -     Urine Culture - Urine, Urine, Clean Catch; Future    2. Microscopic hematuria  -     Urinalysis With Microscopic - Urine, Clean Catch; Future       POC UA shows +leukocytes, trace blood  Will send for microscopic UA d/t +blood and culture  Empiric macrobid sent to pharmacy  Advised increased fluid intake, post coital hygiene practices for UTI prevention    Return if symptoms worsen or fail to improve.     Dictated Utilizing Dragon Dictation    Please note that portions of this note were completed with a voice recognition program.    Part of this note may be an electronic transcription/translation of spoken language to printed text using the Dragon Dictation System.

## 2024-06-11 ENCOUNTER — OFFICE VISIT (OUTPATIENT)
Dept: FAMILY MEDICINE CLINIC | Facility: CLINIC | Age: 54
End: 2024-06-11
Payer: COMMERCIAL

## 2024-06-11 VITALS
HEIGHT: 65 IN | BODY MASS INDEX: 28.92 KG/M2 | WEIGHT: 173.6 LBS | HEART RATE: 72 BPM | SYSTOLIC BLOOD PRESSURE: 132 MMHG | OXYGEN SATURATION: 97 % | DIASTOLIC BLOOD PRESSURE: 84 MMHG

## 2024-06-11 DIAGNOSIS — R31.29 MICROSCOPIC HEMATURIA: ICD-10-CM

## 2024-06-11 DIAGNOSIS — N39.0 URINARY TRACT INFECTION WITHOUT HEMATURIA, SITE UNSPECIFIED: Primary | ICD-10-CM

## 2024-06-11 LAB
BILIRUB BLD-MCNC: NEGATIVE MG/DL
CLARITY, POC: ABNORMAL
COLOR UR: ABNORMAL
EXPIRATION DATE: ABNORMAL
GLUCOSE UR STRIP-MCNC: NEGATIVE MG/DL
KETONES UR QL: NEGATIVE
LEUKOCYTE EST, POC: ABNORMAL
Lab: ABNORMAL
NITRITE UR-MCNC: NEGATIVE MG/ML
PH UR: 6 [PH] (ref 5–8)
PROT UR STRIP-MCNC: ABNORMAL MG/DL
RBC # UR STRIP: ABNORMAL /UL
SP GR UR: 1.01 (ref 1–1.03)
UROBILINOGEN UR QL: NORMAL

## 2024-06-11 PROCEDURE — 87077 CULTURE AEROBIC IDENTIFY: CPT | Performed by: STUDENT IN AN ORGANIZED HEALTH CARE EDUCATION/TRAINING PROGRAM

## 2024-06-11 PROCEDURE — 81001 URINALYSIS AUTO W/SCOPE: CPT | Performed by: STUDENT IN AN ORGANIZED HEALTH CARE EDUCATION/TRAINING PROGRAM

## 2024-06-11 PROCEDURE — 87186 SC STD MICRODIL/AGAR DIL: CPT | Performed by: STUDENT IN AN ORGANIZED HEALTH CARE EDUCATION/TRAINING PROGRAM

## 2024-06-11 PROCEDURE — 87086 URINE CULTURE/COLONY COUNT: CPT | Performed by: STUDENT IN AN ORGANIZED HEALTH CARE EDUCATION/TRAINING PROGRAM

## 2024-06-11 PROCEDURE — 99213 OFFICE O/P EST LOW 20 MIN: CPT | Performed by: STUDENT IN AN ORGANIZED HEALTH CARE EDUCATION/TRAINING PROGRAM

## 2024-06-11 PROCEDURE — 81003 URINALYSIS AUTO W/O SCOPE: CPT | Performed by: STUDENT IN AN ORGANIZED HEALTH CARE EDUCATION/TRAINING PROGRAM

## 2024-06-11 RX ORDER — NITROFURANTOIN 25; 75 MG/1; MG/1
100 CAPSULE ORAL EVERY 12 HOURS SCHEDULED
Qty: 10 CAPSULE | Refills: 0 | Status: SHIPPED | OUTPATIENT
Start: 2024-06-11 | End: 2024-06-16

## 2024-06-11 RX ORDER — SEMAGLUTIDE 2.4 MG/.75ML
INJECTION, SOLUTION SUBCUTANEOUS
COMMUNITY
Start: 2024-06-06

## 2024-06-12 LAB
BACTERIA UR QL AUTO: ABNORMAL /HPF
BILIRUB UR QL STRIP: NEGATIVE
CLARITY UR: CLEAR
COLOR UR: YELLOW
GLUCOSE UR STRIP-MCNC: NEGATIVE MG/DL
HGB UR QL STRIP.AUTO: NEGATIVE
HYALINE CASTS UR QL AUTO: ABNORMAL /LPF
KETONES UR QL STRIP: NEGATIVE
LEUKOCYTE ESTERASE UR QL STRIP.AUTO: ABNORMAL
NITRITE UR QL STRIP: NEGATIVE
PH UR STRIP.AUTO: 8 [PH] (ref 5–8)
PROT UR QL STRIP: ABNORMAL
RBC # UR STRIP: ABNORMAL /HPF
REF LAB TEST METHOD: ABNORMAL
SP GR UR STRIP: 1.02 (ref 1–1.03)
SQUAMOUS #/AREA URNS HPF: ABNORMAL /HPF
UROBILINOGEN UR QL STRIP: ABNORMAL
WBC # UR STRIP: ABNORMAL /HPF

## 2024-06-14 LAB — BACTERIA SPEC AEROBE CULT: ABNORMAL

## 2024-06-18 ENCOUNTER — PATIENT MESSAGE (OUTPATIENT)
Dept: FAMILY MEDICINE CLINIC | Facility: CLINIC | Age: 54
End: 2024-06-18
Payer: COMMERCIAL

## 2024-06-18 RX ORDER — CEFDINIR 300 MG/1
300 CAPSULE ORAL 2 TIMES DAILY
Qty: 14 CAPSULE | Refills: 0 | Status: SHIPPED | OUTPATIENT
Start: 2024-06-18 | End: 2024-06-25

## 2024-06-18 NOTE — TELEPHONE ENCOUNTER
From: Michelle Davies  To: Judith Ochoa  Sent: 6/18/2024 11:12 AM EDT  Subject: UTI    My UTI has come back with a vengeance. Symptoms started last night. I have urgency and pain again. Would you like me to make an appointment or are you going to try a different antibiotic?    Thank you,   Michelle Davies

## 2024-06-25 ENCOUNTER — OFFICE VISIT (OUTPATIENT)
Dept: FAMILY MEDICINE CLINIC | Facility: CLINIC | Age: 54
End: 2024-06-25
Payer: COMMERCIAL

## 2024-06-25 VITALS
BODY MASS INDEX: 27.32 KG/M2 | SYSTOLIC BLOOD PRESSURE: 108 MMHG | HEART RATE: 94 BPM | DIASTOLIC BLOOD PRESSURE: 60 MMHG | WEIGHT: 164 LBS | HEIGHT: 65 IN | OXYGEN SATURATION: 99 %

## 2024-06-25 DIAGNOSIS — R35.0 URINARY FREQUENCY: Primary | ICD-10-CM

## 2024-06-25 LAB
BILIRUB BLD-MCNC: NEGATIVE MG/DL
CLARITY, POC: ABNORMAL
COLOR UR: ABNORMAL
EXPIRATION DATE: ABNORMAL
GLUCOSE UR STRIP-MCNC: NEGATIVE MG/DL
KETONES UR QL: NEGATIVE
LEUKOCYTE EST, POC: ABNORMAL
Lab: ABNORMAL
NITRITE UR-MCNC: NEGATIVE MG/ML
PH UR: 6 [PH] (ref 5–8)
PROT UR STRIP-MCNC: ABNORMAL MG/DL
RBC # UR STRIP: NEGATIVE /UL
SP GR UR: 1.03 (ref 1–1.03)
UROBILINOGEN UR QL: NORMAL

## 2024-06-25 PROCEDURE — 99213 OFFICE O/P EST LOW 20 MIN: CPT | Performed by: STUDENT IN AN ORGANIZED HEALTH CARE EDUCATION/TRAINING PROGRAM

## 2024-06-25 PROCEDURE — 81001 URINALYSIS AUTO W/SCOPE: CPT | Performed by: STUDENT IN AN ORGANIZED HEALTH CARE EDUCATION/TRAINING PROGRAM

## 2024-06-25 PROCEDURE — 81003 URINALYSIS AUTO W/O SCOPE: CPT | Performed by: STUDENT IN AN ORGANIZED HEALTH CARE EDUCATION/TRAINING PROGRAM

## 2024-06-25 PROCEDURE — 87086 URINE CULTURE/COLONY COUNT: CPT | Performed by: STUDENT IN AN ORGANIZED HEALTH CARE EDUCATION/TRAINING PROGRAM

## 2024-06-25 NOTE — PROGRESS NOTES
Chief Complaint   Patient presents with    Urinary Tract Infection       HPI:  Michelle Davies is a 54 y.o. female who presents today for persistent UTI symptoms.     PT was last seen for urinary symptoms on 6/11/24. Symptoms at that time were increased frequency and burning with urination. Urine culture confirmed UTI with S. Sapro. She was treated initially with Macrobid but called the office one week later with persistent symptoms so she was started on Cefdinir. With the cefdinir her burning resolved but she has had persistent urinary frequency since that time. The urine occasionally has an abnormal odor. Since symptoms returned she has abstained from intercourse with her partner. She denies hematuria, fever, chills, flank pain, nausea, vomiting, abnornal vaginal discharge or irritation. She has had no new sexual partners. We previously did STI screening on 4/30 which was negative. She has had only the one confirmed UTI on 6/11. Had similar symptoms at [facility] on 4/30 but urine culture was negative at that time. All symptoms began approx 2 months ago when she became sexually active with her current partner.     PE:  Vitals:    06/25/24 1050   BP: 108/60   Pulse: 94   SpO2: 99%      Body mass index is 27.29 kg/m².    Gen Appearance: NAD  HEENT: Normocephalic, EOMI  Heart: RRR, normal S1 and S2, no murmur  Lungs: CTA b/l, no wheezing, no crackles  Abd: Flat, nondistended, no CVA tenderness  Neuro: No focal deficits    Current Outpatient Medications   Medication Sig Dispense Refill    aspirin 81 MG chewable tablet Chew 1 tablet Daily.      atorvastatin (LIPITOR) 40 MG tablet Take 1 tablet by mouth Every Night.      buPROPion XL (WELLBUTRIN XL) 300 MG 24 hr tablet Take 1 tablet by mouth.      Etonogestrel (Nexplanon) 68 MG implant subdermal implant Inject 1 each into the appropriate area of the skin as directed by provider.      levothyroxine (SYNTHROID, LEVOTHROID) 75 MCG tablet TAKE 1 TABLET BY MOUTH EVERY DAY 30  tablet 0    Multiple Vitamins-Minerals (MULTIVITAMIN ADULT PO) Take  by mouth.      tiZANidine (ZANAFLEX) 4 MG tablet       Topiramate ER (Trokendi XR) 100 MG capsule sustained-release 24 hr Take 1 capsule by mouth Daily.      traZODone (DESYREL) 50 MG tablet Take 1 tablet by mouth every night at bedtime.      Ubrelvy 100 MG tablet 1 tablet.      Venlafaxine HCl (EFFEXOR XR PO) Take 225 mg by mouth Daily.      Wegovy 2.4 MG/0.75ML solution auto-injector        No current facility-administered medications for this visit.        A/P:  Diagnoses and all orders for this visit:    1. Urinary frequency (Primary)  -     POC Urinalysis Dipstick, Automated  -     Urine Culture - Urine, Urine, Clean Catch; Future  -     Urinalysis With Microscopic - Urine, Clean Catch; Future  -     Urine Culture - Urine, Urine, Clean Catch  -     Urinalysis With Microscopic - Urine, Clean Catch       Pt presents with persistent urinary frequency. Recently treated (within last 2 weeks) with Macrobid and then Cefdinir. Both were culture appropriate abx. Dysuria has resolved but frequency has persisted. Repeat UA w micro today and culture. If these are unremarkable we discussed alt differentials including OAB, IC. Could consider trial of oxybutynin. Will await urine studies.     Dictated Utilizing Dragon Dictation    Please note that portions of this note were completed with a voice recognition program.    Part of this note may be an electronic transcription/translation of spoken language to printed text using the Dragon Dictation System.

## 2024-06-26 ENCOUNTER — PATIENT MESSAGE (OUTPATIENT)
Dept: FAMILY MEDICINE CLINIC | Facility: CLINIC | Age: 54
End: 2024-06-26
Payer: COMMERCIAL

## 2024-06-26 DIAGNOSIS — N32.81 OVERACTIVE BLADDER: Primary | ICD-10-CM

## 2024-06-26 LAB
BACTERIA UR QL AUTO: ABNORMAL /HPF
BILIRUB UR QL STRIP: NEGATIVE
CLARITY UR: CLEAR
COLOR UR: YELLOW
GLUCOSE UR STRIP-MCNC: NEGATIVE MG/DL
HGB UR QL STRIP.AUTO: NEGATIVE
HYALINE CASTS UR QL AUTO: ABNORMAL /LPF
KETONES UR QL STRIP: ABNORMAL
LEUKOCYTE ESTERASE UR QL STRIP.AUTO: ABNORMAL
NITRITE UR QL STRIP: NEGATIVE
PH UR STRIP.AUTO: 5.5 [PH] (ref 5–8)
PROT UR QL STRIP: NEGATIVE
RBC # UR STRIP: ABNORMAL /HPF
REF LAB TEST METHOD: ABNORMAL
SP GR UR STRIP: 1.02 (ref 1–1.03)
SQUAMOUS #/AREA URNS HPF: ABNORMAL /HPF
UROBILINOGEN UR QL STRIP: ABNORMAL
WBC # UR STRIP: ABNORMAL /HPF

## 2024-06-26 RX ORDER — SULFAMETHOXAZOLE AND TRIMETHOPRIM 800; 160 MG/1; MG/1
1 TABLET ORAL 2 TIMES DAILY
Qty: 6 TABLET | Refills: 0 | Status: SHIPPED | OUTPATIENT
Start: 2024-06-26 | End: 2024-06-29

## 2024-06-26 NOTE — TELEPHONE ENCOUNTER
From: Michelle Davies  To: Judith Ochoa  Sent: 6/26/2024 12:15 PM EDT  Subject: Urine    There is now blood in my urine and the burning is back. Since 9 am I have run to the bathroom no less than 6 times. I told my co-worker Mei I was going to buy space diapers. LOL. I just wanted to give you an update.

## 2024-06-27 LAB — BACTERIA SPEC AEROBE CULT: NORMAL

## 2024-07-01 RX ORDER — OXYBUTYNIN CHLORIDE 5 MG/1
5 TABLET, EXTENDED RELEASE ORAL DAILY
Qty: 30 TABLET | Refills: 1 | Status: SHIPPED | OUTPATIENT
Start: 2024-07-01

## 2024-07-17 ENCOUNTER — OFFICE VISIT (OUTPATIENT)
Dept: FAMILY MEDICINE CLINIC | Facility: CLINIC | Age: 54
End: 2024-07-17
Payer: COMMERCIAL

## 2024-07-17 VITALS
DIASTOLIC BLOOD PRESSURE: 84 MMHG | SYSTOLIC BLOOD PRESSURE: 116 MMHG | BODY MASS INDEX: 25.66 KG/M2 | HEART RATE: 95 BPM | OXYGEN SATURATION: 98 % | WEIGHT: 154 LBS | HEIGHT: 65 IN

## 2024-07-17 DIAGNOSIS — Z12.31 ENCOUNTER FOR SCREENING MAMMOGRAM FOR MALIGNANT NEOPLASM OF BREAST: ICD-10-CM

## 2024-07-17 DIAGNOSIS — N39.0 RECURRENT UTI: Primary | ICD-10-CM

## 2024-07-17 DIAGNOSIS — N39.0 URINARY TRACT INFECTION WITHOUT HEMATURIA, SITE UNSPECIFIED: ICD-10-CM

## 2024-07-17 LAB
BILIRUB BLD-MCNC: NEGATIVE MG/DL
CLARITY, POC: ABNORMAL
COLOR UR: ABNORMAL
EXPIRATION DATE: ABNORMAL
GLUCOSE UR STRIP-MCNC: NEGATIVE MG/DL
KETONES UR QL: ABNORMAL
LEUKOCYTE EST, POC: ABNORMAL
Lab: ABNORMAL
NITRITE UR-MCNC: NEGATIVE MG/ML
PH UR: 6 [PH] (ref 5–8)
PROT UR STRIP-MCNC: ABNORMAL MG/DL
RBC # UR STRIP: NEGATIVE /UL
SP GR UR: 1.02 (ref 1–1.03)
UROBILINOGEN UR QL: NORMAL

## 2024-07-17 PROCEDURE — 99214 OFFICE O/P EST MOD 30 MIN: CPT | Performed by: STUDENT IN AN ORGANIZED HEALTH CARE EDUCATION/TRAINING PROGRAM

## 2024-07-17 PROCEDURE — 81003 URINALYSIS AUTO W/O SCOPE: CPT | Performed by: STUDENT IN AN ORGANIZED HEALTH CARE EDUCATION/TRAINING PROGRAM

## 2024-07-17 PROCEDURE — 87086 URINE CULTURE/COLONY COUNT: CPT | Performed by: STUDENT IN AN ORGANIZED HEALTH CARE EDUCATION/TRAINING PROGRAM

## 2024-07-17 PROCEDURE — 87077 CULTURE AEROBIC IDENTIFY: CPT | Performed by: STUDENT IN AN ORGANIZED HEALTH CARE EDUCATION/TRAINING PROGRAM

## 2024-07-17 PROCEDURE — 87186 SC STD MICRODIL/AGAR DIL: CPT | Performed by: STUDENT IN AN ORGANIZED HEALTH CARE EDUCATION/TRAINING PROGRAM

## 2024-07-17 RX ORDER — ESTRADIOL 0.1 MG/G
CREAM VAGINAL
Qty: 42.5 G | Refills: 0 | Status: SHIPPED | OUTPATIENT
Start: 2024-07-17 | End: 2024-08-30

## 2024-07-17 NOTE — PROGRESS NOTES
Chief Complaint   Patient presents with    Urinary Tract Infection       HPI:  Michelle Davies is a 54 y.o. female who presents today for recurrent UTI.    Answers submitted by the patient for this visit:  Other (Submitted on 7/16/2024)  Please describe your symptoms.: sporadic frequent urination with foul smell, occasional blood in urine  Have you had these symptoms before?: Yes  How long have you been having these symptoms?: Greater than 2 weeks  Please list any medications you are currently taking for this condition.: I don't have the pill bottle with me.  Primary Reason for Visit (Submitted on 7/16/2024)  What is the primary reason for your visit?: Other    Patient is struggled with recurrent UTIs symptoms since establish care visit on 4/30.  Initially thought UTIs were triggered her becoming sexually active again.  These have persisted despite trial of stopping sexual activity.  Urine culture result on 4/30 was no growth.  She had recurrent symptoms and urine culture on 6/11 showed staph saprophyticus.  Most recent urine culture on 6/25 showed normal urogenital srinivasa.  Patient has been treated with culture directed therapy including Macrobid, cefdinir and Bactrim.  With each antibiotic course she experiences temporary relief in her symptoms but then these always return.  Currently she experiences constant bladder pressure, occasional dysuria, and occasional gross hematuria.    PE:  Vitals:    07/17/24 1006   BP: 116/84   Pulse: 95   SpO2: 98%      Body mass index is 25.63 kg/m².    Gen Appearance: NAD  HEENT: Normocephalic, EOMI  Lungs: Normal WOB  MSK: Moves all extremities well, normal gait, no peripheral edema  Neuro: No focal deficits    Current Outpatient Medications   Medication Sig Dispense Refill    aspirin 81 MG chewable tablet Chew 1 tablet Daily.      atorvastatin (LIPITOR) 40 MG tablet Take 1 tablet by mouth Every Night.      buPROPion XL (WELLBUTRIN XL) 300 MG 24 hr tablet Take 1 tablet by mouth.       Etonogestrel (Nexplanon) 68 MG implant subdermal implant To be inserted one time by prescriber. Route Subdermal.      levothyroxine (SYNTHROID, LEVOTHROID) 75 MCG tablet TAKE 1 TABLET BY MOUTH EVERY DAY 30 tablet 0    Multiple Vitamins-Minerals (MULTIVITAMIN ADULT PO) Take  by mouth.      oxybutynin XL (DITROPAN-XL) 5 MG 24 hr tablet Take 1 tablet by mouth Daily. 30 tablet 1    tiZANidine (ZANAFLEX) 4 MG tablet       Topiramate ER (Trokendi XR) 100 MG capsule sustained-release 24 hr Take 1 capsule by mouth Daily.      traZODone (DESYREL) 50 MG tablet Take 1 tablet by mouth every night at bedtime.      Ubrelvy 100 MG tablet 1 tablet.      Venlafaxine HCl (EFFEXOR XR PO) Take 225 mg by mouth Daily.      Wegovy 2.4 MG/0.75ML solution auto-injector       estradiol (ESTRACE VAGINAL) 0.1 MG/GM vaginal cream Insert 2 g into the vagina Daily for 14 days, THEN 1 g 2 (Two) Times a Week for 30 days. 42.5 g 0     No current facility-administered medications for this visit.        A/P:  Diagnoses and all orders for this visit:    1. Recurrent UTI (Primary)  Patient presents with recurrent UTI symptoms.  This is her third or fourth episode over the last 2 months.  She was trialed on oxybutynin for OAB at last visit and has had no relief in her symptoms.  Will DC this therapy.  Start topical Estrace cream.  Counseled systemic absorption at this dose is very low.  Patient does have history of CVA so we will closely monitor blood pressure or for development of any neurologic symptoms.  No personal or family history of breast or gynecologic cancer.  -     Urine Culture - Urine, Urine, Clean Catch; Future  -     estradiol (ESTRACE VAGINAL) 0.1 MG/GM vaginal cream; Insert 2 g into the vagina Daily for 14 days, THEN 1 g 2 (Two) Times a Week for 30 days.  Dispense: 42.5 g; Refill: 0  -     Urine Culture - Urine, Urine, Clean Catch    2. Urinary tract infection without hematuria, site unspecified  -     POCT urinalysis dipstick,  automated    3. Encounter for screening mammogram for malignant neoplasm of breast  -     Mammo Screening Digital Tomosynthesis Bilateral With CAD; Future         Return in about 4 weeks (around 8/14/2024) for FU recurrent UTI.     Dictated Utilizing Dragon Dictation    Please note that portions of this note were completed with a voice recognition program.    Part of this note may be an electronic transcription/translation of spoken language to printed text using the Dragon Dictation System.

## 2024-07-18 ENCOUNTER — TELEPHONE (OUTPATIENT)
Dept: FAMILY MEDICINE CLINIC | Facility: CLINIC | Age: 54
End: 2024-07-18
Payer: COMMERCIAL

## 2024-07-18 RX ORDER — SULFAMETHOXAZOLE AND TRIMETHOPRIM 800; 160 MG/1; MG/1
1 TABLET ORAL 2 TIMES DAILY
Qty: 6 TABLET | Refills: 0 | Status: SHIPPED | OUTPATIENT
Start: 2024-07-18 | End: 2024-07-21

## 2024-07-18 NOTE — TELEPHONE ENCOUNTER
----- Message from Judith Ochoa sent at 7/18/2024  1:04 PM EDT -----    Please let patient know her urine culture is showing bacteria c/w UTI. Sent rx for Bactrim to the pharmacy for her.    Unable to reach patient.    Please relay.

## 2024-07-19 LAB — BACTERIA SPEC AEROBE CULT: ABNORMAL

## 2024-07-19 RX ORDER — NITROFURANTOIN 25; 75 MG/1; MG/1
100 CAPSULE ORAL EVERY 12 HOURS SCHEDULED
Qty: 10 CAPSULE | Refills: 0 | Status: SHIPPED | OUTPATIENT
Start: 2024-07-19 | End: 2024-07-24

## 2024-07-19 NOTE — TELEPHONE ENCOUNTER
Judith Ochoa, DO  7/19/2024  4:11 PM EDT Back to Top      We need to switch her bactrim as urine culture shows her UTI is resistant. New prescription for Macrobid sent to pharmacy     Please relay.

## 2024-07-22 ENCOUNTER — TELEPHONE (OUTPATIENT)
Dept: FAMILY MEDICINE CLINIC | Facility: CLINIC | Age: 54
End: 2024-07-22
Payer: COMMERCIAL

## 2024-07-29 RX ORDER — SULFAMETHOXAZOLE AND TRIMETHOPRIM 800; 160 MG/1; MG/1
TABLET ORAL
Qty: 6 TABLET | Refills: 0 | OUTPATIENT
Start: 2024-07-29

## 2024-08-06 DIAGNOSIS — E03.8 OTHER SPECIFIED HYPOTHYROIDISM: ICD-10-CM

## 2024-08-06 RX ORDER — LEVOTHYROXINE SODIUM 0.07 MG/1
TABLET ORAL
Qty: 90 TABLET | Refills: 1 | Status: SHIPPED | OUTPATIENT
Start: 2024-08-06

## 2024-08-14 ENCOUNTER — OFFICE VISIT (OUTPATIENT)
Dept: FAMILY MEDICINE CLINIC | Facility: CLINIC | Age: 54
End: 2024-08-14

## 2024-08-14 VITALS
DIASTOLIC BLOOD PRESSURE: 86 MMHG | HEART RATE: 93 BPM | WEIGHT: 159 LBS | BODY MASS INDEX: 26.49 KG/M2 | OXYGEN SATURATION: 99 % | HEIGHT: 65 IN | SYSTOLIC BLOOD PRESSURE: 118 MMHG

## 2024-08-14 DIAGNOSIS — N39.0 RECURRENT UTI: ICD-10-CM

## 2024-08-14 DIAGNOSIS — J30.2 SEASONAL ALLERGIES: ICD-10-CM

## 2024-08-14 DIAGNOSIS — Z98.84 GENERALIZED EXCESS AND REDUNDANT SKIN AFTER BARIATRIC SURGERY: ICD-10-CM

## 2024-08-14 DIAGNOSIS — R39.9 UTI SYMPTOMS: Primary | ICD-10-CM

## 2024-08-14 DIAGNOSIS — L98.7 GENERALIZED EXCESS AND REDUNDANT SKIN AFTER BARIATRIC SURGERY: ICD-10-CM

## 2024-08-14 DIAGNOSIS — R05.8 DRY COUGH: ICD-10-CM

## 2024-08-14 LAB
BILIRUB BLD-MCNC: NEGATIVE MG/DL
CLARITY, POC: CLEAR
COLOR UR: YELLOW
EXPIRATION DATE: ABNORMAL
GLUCOSE UR STRIP-MCNC: NEGATIVE MG/DL
KETONES UR QL: NEGATIVE
LEUKOCYTE EST, POC: ABNORMAL
Lab: ABNORMAL
NITRITE UR-MCNC: POSITIVE MG/ML
PH UR: 6 [PH] (ref 5–8)
PROT UR STRIP-MCNC: ABNORMAL MG/DL
RBC # UR STRIP: NEGATIVE /UL
SP GR UR: 1.02 (ref 1–1.03)
UROBILINOGEN UR QL: NORMAL

## 2024-08-14 PROCEDURE — 87077 CULTURE AEROBIC IDENTIFY: CPT | Performed by: STUDENT IN AN ORGANIZED HEALTH CARE EDUCATION/TRAINING PROGRAM

## 2024-08-14 PROCEDURE — 99214 OFFICE O/P EST MOD 30 MIN: CPT | Performed by: STUDENT IN AN ORGANIZED HEALTH CARE EDUCATION/TRAINING PROGRAM

## 2024-08-14 PROCEDURE — 87086 URINE CULTURE/COLONY COUNT: CPT | Performed by: STUDENT IN AN ORGANIZED HEALTH CARE EDUCATION/TRAINING PROGRAM

## 2024-08-14 PROCEDURE — 87186 SC STD MICRODIL/AGAR DIL: CPT | Performed by: STUDENT IN AN ORGANIZED HEALTH CARE EDUCATION/TRAINING PROGRAM

## 2024-08-14 PROCEDURE — 81003 URINALYSIS AUTO W/O SCOPE: CPT | Performed by: STUDENT IN AN ORGANIZED HEALTH CARE EDUCATION/TRAINING PROGRAM

## 2024-08-14 RX ORDER — NITROFURANTOIN 25; 75 MG/1; MG/1
100 CAPSULE ORAL EVERY 12 HOURS SCHEDULED
Qty: 10 CAPSULE | Refills: 0 | Status: SHIPPED | OUTPATIENT
Start: 2024-08-14 | End: 2024-08-19

## 2024-08-14 NOTE — PROGRESS NOTES
"Chief Complaint   Patient presents with    Urinary Tract Infection     F/U FU recurrent UTI.   Believes having one now has some urgency not too bad, but also has odor       HPI:  Michelle Davies is a 54 y.o. female who presents today for recurrent UTI.    Patient has struggled with recurrent UTIs symptoms since April of this year.  Previous cultures have shown: 4/30 No Growth, 6/11 Pan susceptible Staph saprophyticus, 6/25 Normal Jennie, 7/17 ESBL.  She has had STI testing which was all negative.  She was trialed on oxybutynin at 1 point for increased frequency and suspected OAB component but this was ineffective.  At last visit in June she was started on topical estrogen therapy.  Patient states that \"my body rejects the estrogen.\" Suppository immediately comes out after insertion.  It has also caused severe itching.  She has continued to experience increased urinary frequency and abnormal urine odor.  When she is on antibiotics symptoms completely resolve but will reappear shortly after finishing.  She does take Azo as needed but last dose was weeks ago.    The last few weeks.  Mainly notices at night.  Has some runny nose.  Has history of severe seasonal allergies, was previously on allergy shots but has not taken these in approximately 3 years.  Would like to reestablish with allergist.  Requests referral.  She is not currently taking any allergy medication.    Has h/o bariatric surgery in 2001. Lost significant amount of weight after this. Has excess skin around abdomen and pannus. Get recurrent eczematous rash and fungal rashes in skin fold area. Would like to discuss procedure to remove excess skin.  PE:  Vitals:    08/14/24 0759   BP: 118/86   Pulse: 93   SpO2: 99%      Body mass index is 26.46 kg/m².    Gen Appearance: NAD  HEENT: Normocephalic, EOMI  Heart: RRR, normal S1 and S2, no murmur  Lungs: CTA b/l, no wheezing, no crackles  MSK: Moves all extremities well, normal gait, no peripheral edema  Pulses: " Palpable and equal b/l  Neuro: No focal deficits    Current Outpatient Medications   Medication Sig Dispense Refill    aspirin 81 MG chewable tablet Chew 1 tablet Daily.      atorvastatin (LIPITOR) 40 MG tablet Take 1 tablet by mouth Every Night.      buPROPion XL (WELLBUTRIN XL) 300 MG 24 hr tablet Take 1 tablet by mouth.      estradiol (ESTRACE VAGINAL) 0.1 MG/GM vaginal cream Insert 2 g into the vagina Daily for 14 days, THEN 1 g 2 (Two) Times a Week for 30 days. 42.5 g 0    Etonogestrel (Nexplanon) 68 MG implant subdermal implant To be inserted one time by prescriber. Route Subdermal.      levothyroxine (SYNTHROID, LEVOTHROID) 75 MCG tablet TAKE 1 TABLET BY MOUTH EVERY DAY 90 tablet 1    Multiple Vitamins-Minerals (MULTIVITAMIN ADULT PO) Take  by mouth.      tiZANidine (ZANAFLEX) 4 MG tablet       Topiramate ER (Trokendi XR) 100 MG capsule sustained-release 24 hr Take 1 capsule by mouth Daily.      traZODone (DESYREL) 50 MG tablet Take 1 tablet by mouth every night at bedtime.      Ubrelvy 100 MG tablet 1 tablet.      Venlafaxine HCl (EFFEXOR XR PO) Take 225 mg by mouth Daily.      Wegovy 2.4 MG/0.75ML solution auto-injector       nitrofurantoin, macrocrystal-monohydrate, (MACROBID) 100 MG capsule Take 1 capsule by mouth Every 12 (Twelve) Hours for 5 days. 10 capsule 0     No current facility-administered medications for this visit.        A/P:  Diagnoses and all orders for this visit:    1. UTI symptoms (Primary)  2. Recurrent UTI  Recurrent UTI symptoms that began in April 2024  Previous culture data: 4/30 No Growth, 6/11 Pan susceptible Staph saprophyticus, 6/25 Normal Jennie, 7/17 ESBL (susceptible to Macrobid)  No improvement with topical estrogen therapy over the last month  Referral to urology placed for further workup/management  Can start daily cranberry tablet in the meantime  POC UA concerning for infection today, send out for culture  Empirically treat with Macrobid  -     Urine Culture - Urine,  Urine, Clean Catch; Future  -     Ambulatory Referral to Urology  -     nitrofurantoin, macrocrystal-monohydrate, (MACROBID) 100 MG capsule; Take 1 capsule by mouth Every 12 (Twelve) Hours for 5 days.  Dispense: 10 capsule; Refill: 0    3. Seasonal allergies  4. Dry cough  -     Ambulatory Referral to Allergy    5. Generalized excess and redundant skin after bariatric surgery  -     Ambulatory Referral to Plastic Surgery         Return in about 3 months (around 11/14/2024) for FU UTI/allergies.     Dictated Utilizing Dragon Dictation    Please note that portions of this note were completed with a voice recognition program.    Part of this note may be an electronic transcription/translation of spoken language to printed text using the Dragon Dictation System.

## 2024-08-17 LAB — BACTERIA SPEC AEROBE CULT: ABNORMAL

## 2024-08-21 ENCOUNTER — OFFICE VISIT (OUTPATIENT)
Dept: UROLOGY | Facility: CLINIC | Age: 54
End: 2024-08-21
Payer: COMMERCIAL

## 2024-08-21 VITALS
WEIGHT: 159 LBS | SYSTOLIC BLOOD PRESSURE: 143 MMHG | DIASTOLIC BLOOD PRESSURE: 86 MMHG | BODY MASS INDEX: 26.49 KG/M2 | HEIGHT: 65 IN | HEART RATE: 103 BPM | OXYGEN SATURATION: 98 %

## 2024-08-21 DIAGNOSIS — N39.0 RECURRENT UTI: Primary | ICD-10-CM

## 2024-08-21 LAB
BILIRUB BLD-MCNC: NEGATIVE MG/DL
CLARITY, POC: CLEAR
COLOR UR: YELLOW
EXPIRATION DATE: NORMAL
GLUCOSE UR STRIP-MCNC: NEGATIVE MG/DL
KETONES UR QL: NEGATIVE
LEUKOCYTE EST, POC: NEGATIVE
Lab: NORMAL
NITRITE UR-MCNC: NEGATIVE MG/ML
PH UR: 6 [PH] (ref 5–8)
PROT UR STRIP-MCNC: NEGATIVE MG/DL
RBC # UR STRIP: NEGATIVE /UL
SP GR UR: 1.01 (ref 1–1.03)
UROBILINOGEN UR QL: NORMAL

## 2024-08-21 RX ORDER — ASCORBIC ACID 500 MG
500 TABLET ORAL DAILY
Qty: 90 TABLET | Refills: 1 | Status: SHIPPED | OUTPATIENT
Start: 2024-08-21

## 2024-08-21 RX ORDER — METHENAMINE HIPPURATE 1000 MG/1
1 TABLET ORAL 2 TIMES DAILY WITH MEALS
Qty: 90 TABLET | Refills: 1 | Status: SHIPPED | OUTPATIENT
Start: 2024-08-21

## 2024-08-21 NOTE — PROGRESS NOTES
"       Office Visit New Urology      Patient Name: Michelle Davies  : 1970   MRN: [id number]     Chief Complaint:    Chief Complaint   Patient presents with    Recurrent UTIs       Referring Provider: Judith Ochoa DO    History of Present Illness: Michelle Davies is a 54 y.o. female who is referred by her PCP Dr. Ochoa with PMH of HTN, HLD, Hypothyroidism, Stroke, Pituitary adenoma and Asthma who who presents to Urology today for history of recurrent UTI. She reports her urinary symptoms began in  with associated urinary frequency, urgency, dysuria and nocturia. She reports occasional urinary urgency at baseline. She denies urinary urge incontinence. No history of kidney stones. She does report history of Bacterial Vaginosis previously.  She was previously prescribed vaginal Estrace cream but stopped using it as she reports her body \"rejected the cream\" and it would \"not stay in\".     Urine Culture   24; no growth  24; Staphylococcus saprophyticus  23; <25,000 normal srinivasa  24; E.Coli ESBL; only susceptible PO option is macrobid  24; E.coli ESBL only susceptible PO option is macrobid. (She was treated with 5 day course of macrobid. I recommended additional 3 days of Macrobid after discussing urine culture with PCP). She has not started additional 3 day course of macrobid.     She denies UTI symptoms at this time.        She is taking daily Probiotic.     She does not attribute UTIs to intercourse.     Urinalysis is negative for infection or blood.   Bladder scan PVR 0cc.   Subjective      Review of System: Review of Systems   Genitourinary:  Negative for dysuria, frequency, hematuria and urgency.   All other systems reviewed and are negative.     I have reviewed the ROS documented by my clinical staff, I have updated appropriately and I agree. FARHEEN Connors    Past Medical History:   Past Medical History:   Diagnosis Date    Allergic 10/2014    Anemia     " Anxiety     Arthritis     Asthma     Brain tumor (benign)     Depression     Hyperlipidemia     Hypertension     Hypothyroidism     History of Graves disease age 30, on methimazole, then developed hypothyroidism, no history of TRAORE    Low back pain     Migraine     Obesity     Pituitary adenoma     Stroke 3/15/2021    Substance abuse     Sober since 2004    Urinary tract infection 2024    Visual impairment        Past Surgical History:   Past Surgical History:   Procedure Laterality Date    BARIATRIC SURGERY      CHOLECYSTECTOMY      COLONOSCOPY      GASTRIC BYPASS      LUMBAR DISC SURGERY      MANDIBLE SURGERY  1989    reconstruction surgery    REDUCTION MAMMAPLASTY      SPINE SURGERY  2009    Discectomy       Family History:   Family History   Problem Relation Age of Onset    Hypertension Mother     Arthritis Mother     Hyperlipidemia Mother     Depression Mother     Anxiety disorder Mother     Obesity Mother     Thyroid disease Mother     Alcohol abuse Mother         Estranged    Asthma Mother     Drug abuse Mother     Mental illness Mother     Vision loss Mother         Macular degeneration    Cancer Maternal Grandmother         Never knew -     Alcohol abuse Maternal Grandmother     Alcohol abuse Maternal Grandfather             Anxiety disorder Father     Depression Sister     Anxiety disorder Brother     Drug abuse Brother     Obesity Paternal Aunt     Obesity Paternal Uncle        Social History:   Social History     Socioeconomic History    Marital status:    Tobacco Use    Smoking status: Never     Passive exposure: Never    Smokeless tobacco: Never   Vaping Use    Vaping status: Never Used   Substance and Sexual Activity    Alcohol use: Not Currently     Comment: Socially but haven’t had anything since     Drug use: Not Currently     Types: Oxycodone     Comment: No use since 2004    Sexual activity: Yes     Partners: Male     Birth  control/protection: Nexplanon     Comment: Have implant in arm that is        Medications:     Current Outpatient Medications:     aspirin 81 MG chewable tablet, Chew 1 tablet Daily., Disp: , Rfl:     atorvastatin (LIPITOR) 40 MG tablet, Take 1 tablet by mouth Every Night., Disp: , Rfl:     buPROPion XL (WELLBUTRIN XL) 300 MG 24 hr tablet, Take 1 tablet by mouth., Disp: , Rfl:     estradiol (ESTRACE VAGINAL) 0.1 MG/GM vaginal cream, Insert 2 g into the vagina Daily for 14 days, THEN 1 g 2 (Two) Times a Week for 30 days., Disp: 42.5 g, Rfl: 0    Etonogestrel (Nexplanon) 68 MG implant subdermal implant, To be inserted one time by prescriber. Route Subdermal., Disp: , Rfl:     levothyroxine (SYNTHROID, LEVOTHROID) 75 MCG tablet, TAKE 1 TABLET BY MOUTH EVERY DAY, Disp: 90 tablet, Rfl: 1    Multiple Vitamins-Minerals (MULTIVITAMIN ADULT PO), Take  by mouth., Disp: , Rfl:     nitrofurantoin, macrocrystal-monohydrate, (MACROBID) 100 MG capsule, Take 1 capsule by mouth 2 (Two) Times a Day for 3 days., Disp: 6 capsule, Rfl: 0    tiZANidine (ZANAFLEX) 4 MG tablet, , Disp: , Rfl:     Topiramate ER (Trokendi XR) 100 MG capsule sustained-release 24 hr, Take 1 capsule by mouth Daily., Disp: , Rfl:     traZODone (DESYREL) 50 MG tablet, Take 1 tablet by mouth every night at bedtime., Disp: , Rfl:     Ubrelvy 100 MG tablet, 1 tablet., Disp: , Rfl:     Venlafaxine HCl (EFFEXOR XR PO), Take 225 mg by mouth Daily., Disp: , Rfl:     Wegovy 2.4 MG/0.75ML solution auto-injector, , Disp: , Rfl:     methenamine (HIPREX) 1 g tablet, Take 1 tablet by mouth 2 (Two) Times a Day With Meals., Disp: 90 tablet, Rfl: 1    vitamin C (ASCORBIC ACID) 500 MG tablet, Take 1 tablet by mouth Daily., Disp: 90 tablet, Rfl: 1    Allergies:   No Known Allergies    Bladder & Bowel Symptom Questionnaire    How often do you usually urinate during the day ?   2 - About every 2-3 hours    2.   How many timed do you urinate at night?   1 - 2 times at night  "  3.   What is the reason that you usually urinate?   3 - Severe urge (can delay less than 10 min)   4.   Once you get the urge to go, how long can you     comfortably delay?   3 - Less than 10 min   5.   How often do you get a sudden urge that makes you rush to the bathroom?   4 - At least once a day   6.   How often does a sudden urge to urinate result in you leaking urine or wetting pads?   2 - A few times a month   7.  In your opinion, how good is your bladder control?   2 - Good   8.  Do you have accidental bowel leakage?   yes   9.  Do you have difficulty fully emptying your bladder?   no   10.  Do you experience accidental leakage when performing some physical activity such as coughing, sneezing, laughing or exercise?   no   11. Have you tried medications to help improve your symptoms?   yes   12. Would you be interested in learning about a long-lasting option that may help you with your symptoms?   yes                                                                             Total Score   14     0-7 (Mild) 8-16 (Moderate) 17-28 (Severe)       Post Void Residual:  0 mL    Objective     Physical Exam:   Vital Signs:   Vitals:    08/21/24 1506   BP: 143/86   BP Location: Left arm   Patient Position: Sitting   Cuff Size: Adult   Pulse: 103   SpO2: 98%   Weight: 72.1 kg (159 lb)   Height: 165.1 cm (65\")     Body mass index is 26.46 kg/m².     Physical Exam  Vitals and nursing note reviewed.   Constitutional:       Appearance: Normal appearance.   HENT:      Head: Normocephalic and atraumatic.      Nose: Nose normal.      Mouth/Throat:      Mouth: Mucous membranes are moist.   Eyes:      Pupils: Pupils are equal, round, and reactive to light.   Pulmonary:      Effort: Pulmonary effort is normal.   Abdominal:      General: Abdomen is flat.      Palpations: Abdomen is soft.   Musculoskeletal:         General: Normal range of motion.      Cervical back: Normal range of motion.   Skin:     General: Skin is warm and " dry.      Capillary Refill: Capillary refill takes less than 2 seconds.   Neurological:      General: No focal deficit present.      Mental Status: She is alert.   Psychiatric:         Mood and Affect: Mood normal.       Labs:   Brief Urine Lab Results  (Last result in the past 365 days)        Color   Clarity   Blood   Leuk Est   Nitrite   Protein   CREAT   Urine HCG        08/21/24 1523 Yellow   Clear   Negative   Negative   Negative   Negative                   Urine Culture          6/25/2024    12:18 7/17/2024    10:45 8/14/2024    08:18   Urine Culture   Urine Culture <25,000 CFU/mL Normal Urogenital Jennie  >100,000 CFU/mL Escherichia coli ESBL  >100,000 CFU/mL Escherichia coli ESBL         Lab Results   Component Value Date    GLUCOSE 95 04/30/2024    CALCIUM 10.3 04/30/2024     04/30/2024    K 4.7 04/30/2024    CO2 22.4 04/30/2024     (H) 04/30/2024    BUN 19 04/30/2024    CREATININE 0.92 04/30/2024    EGFRIFNONA 64 09/28/2020    BCR 20.7 04/30/2024    ANIONGAP 13.6 04/30/2024       Lab Results   Component Value Date    WBC 5.53 04/30/2024    HGB 13.7 04/30/2024    HCT 43.1 04/30/2024    MCV 84.5 04/30/2024     04/30/2024       Images:   No Images in the past 120 days found..    Measures:   Tobacco:   Michelle Davies  reports that she has never smoked. She has never been exposed to tobacco smoke. She has never used smokeless tobacco.          Urine Incontinence: Patient reports that she is not currently experiencing any symptoms of urinary incontinence.    Assessment / Plan      Assessment/Plan:   Michelle Davies is a 54 y.o. female who presented today for recurrent UTI.    Urinalysis is negative for infection today.  However given her recent positive urine cultures with E. coli ESBL we will send urine for guidance PCR to assess for other uropathogens and bacterial vaginosis.  We will notify her with results.  I instructed her to begin the additional 3-day course of Macrobid and then she will  begin Hiprex twice daily for UTI prophylaxis with vitamin C.  She will follow-up in 8 weeks for symptom check and repeat urinalysis.    I have advised her to use pea sized amount of vaginal estrace cream 3 times weekly for UTI prophylaxis.     Diagnoses and all orders for this visit:    1. Recurrent UTI (Primary)  -     POC Urinalysis Dipstick, Automated  -     methenamine (HIPREX) 1 g tablet; Take 1 tablet by mouth 2 (Two) Times a Day With Meals.  Dispense: 90 tablet; Refill: 1  -     vitamin C (ASCORBIC ACID) 500 MG tablet; Take 1 tablet by mouth Daily.  Dispense: 90 tablet; Refill: 1       Follow Up:   Return for 8 weeks .    I spent approximately 30 minutes providing clinical care for this patient; including review of patient's chart and provider documentation, face to face time spent with patient in examination room (obtaining history, performing physical exam, discussing diagnosis and management options), placing orders, and completing patient documentation.     FARHEEN Connors  [facility] [place]

## 2024-08-28 DIAGNOSIS — N39.0 URINARY TRACT INFECTION WITHOUT HEMATURIA, SITE UNSPECIFIED: ICD-10-CM

## 2024-08-28 DIAGNOSIS — N76.0 BACTERIAL VAGINOSIS: Primary | ICD-10-CM

## 2024-08-28 DIAGNOSIS — B96.89 BACTERIAL VAGINOSIS: Primary | ICD-10-CM

## 2024-08-28 RX ORDER — NITROFURANTOIN 25; 75 MG/1; MG/1
100 CAPSULE ORAL 2 TIMES DAILY
Qty: 14 CAPSULE | Refills: 0 | Status: SHIPPED | OUTPATIENT
Start: 2024-08-28 | End: 2024-09-04

## 2024-08-28 RX ORDER — METRONIDAZOLE 7.5 MG/G
1 GEL VAGINAL NIGHTLY
Qty: 1 EACH | Refills: 0 | Status: SHIPPED | OUTPATIENT
Start: 2024-08-28 | End: 2024-09-02

## 2024-09-12 ENCOUNTER — HOSPITAL ENCOUNTER (OUTPATIENT)
Dept: MAMMOGRAPHY | Facility: HOSPITAL | Age: 54
Discharge: HOME | End: 2024-09-12
Admitting: STUDENT IN AN ORGANIZED HEALTH CARE EDUCATION/TRAINING PROGRAM
Payer: COMMERCIAL

## 2024-09-12 DIAGNOSIS — Z12.31 ENCOUNTER FOR SCREENING MAMMOGRAM FOR MALIGNANT NEOPLASM OF BREAST: ICD-10-CM

## 2024-09-12 PROCEDURE — 77067 SCR MAMMO BI INCL CAD: CPT

## 2024-09-12 PROCEDURE — 77063 BREAST TOMOSYNTHESIS BI: CPT

## 2024-10-30 ENCOUNTER — PATIENT MESSAGE (OUTPATIENT)
Dept: FAMILY MEDICINE CLINIC | Facility: CLINIC | Age: 54
End: 2024-10-30
Payer: OTHER GOVERNMENT

## 2024-10-30 DIAGNOSIS — E66.3 OVERWEIGHT (BMI 25.0-29.9): Primary | ICD-10-CM

## 2024-10-30 RX ORDER — SEMAGLUTIDE 2.4 MG/.75ML
2.4 INJECTION, SOLUTION SUBCUTANEOUS WEEKLY
Qty: 2 ML | Refills: 5 | Status: SHIPPED | OUTPATIENT
Start: 2024-10-30

## 2024-11-06 ENCOUNTER — PATIENT MESSAGE (OUTPATIENT)
Dept: FAMILY MEDICINE CLINIC | Facility: CLINIC | Age: 54
End: 2024-11-06
Payer: COMMERCIAL

## 2024-11-06 RX ORDER — VENLAFAXINE HYDROCHLORIDE 225 MG/1
225 TABLET, EXTENDED RELEASE ORAL
Qty: 90 EACH | Refills: 1 | Status: SHIPPED | OUTPATIENT
Start: 2024-11-06

## 2024-11-07 ENCOUNTER — OFFICE VISIT (OUTPATIENT)
Dept: UROLOGY | Facility: CLINIC | Age: 54
End: 2024-11-07
Payer: COMMERCIAL

## 2024-11-07 VITALS — HEART RATE: 93 BPM | SYSTOLIC BLOOD PRESSURE: 144 MMHG | OXYGEN SATURATION: 98 % | DIASTOLIC BLOOD PRESSURE: 95 MMHG

## 2024-11-07 DIAGNOSIS — N39.0 RECURRENT UTI: Primary | ICD-10-CM

## 2024-11-07 LAB
BILIRUB BLD-MCNC: NEGATIVE MG/DL
CLARITY, POC: CLEAR
COLOR UR: NORMAL
EXPIRATION DATE: NORMAL
GLUCOSE UR STRIP-MCNC: NEGATIVE MG/DL
KETONES UR QL: NEGATIVE
LEUKOCYTE EST, POC: NEGATIVE
Lab: NORMAL
NITRITE UR-MCNC: NEGATIVE MG/ML
PH UR: 6 [PH] (ref 5–8)
PROT UR STRIP-MCNC: NEGATIVE MG/DL
RBC # UR STRIP: NEGATIVE /UL
SP GR UR: 1.02 (ref 1–1.03)
UROBILINOGEN UR QL: NORMAL

## 2024-11-07 NOTE — PROGRESS NOTES
Follow Up Office Visit      Patient Name: Michelle Davies  : 1970   MRN: [id number]     Chief Complaint:    Chief Complaint   Patient presents with    Recurrent UTI       Referring Provider: No ref. provider found    History of Present Illness: Michelle Davies is a 54 y.o. female who presents today for follow up of recurrent UTI.  At her last office visit guidance PCR was found to have multiple bacteria in urine as well as bacterial vaginosis.  She was treated with course of Flagyl vaginal gel and antibiotic.  She reports she is feeling well today and has not had another UTI since last office visit.  She was prescribed Hip-Christos twice daily for UTI prophylaxis at last office visit however she has forgotten to take the medication.    Urinalysis negative for infection or blood.   Subjective      Review of System:   Review of Systems   Genitourinary:  Negative for dysuria, frequency and urgency.   All other systems reviewed and are negative.     I have reviewed the ROS documented by my clinical staff, I have updated appropriately and I agree. FARHEEN Sibley    I have reviewed and the following portions of the patient's history were updated as appropriate: past family history, past medical history, past social history, past surgical history and problem list.    Past Medical History:   Past Medical History:   Diagnosis Date    Allergic 10/2014    Anemia     Anxiety     Arthritis     Asthma     Brain tumor (benign)     Depression     Hyperlipidemia     Hypertension     Hypothyroidism     History of Graves disease age 30, on methimazole, then developed hypothyroidism, no history of TRAORE    Low back pain     Migraine     Obesity     Pituitary adenoma     Stroke 3/15/2021    Substance abuse     Sober since 2004    Urinary tract infection 2024    Visual impairment        Past Surgical History:  Past Surgical History:   Procedure Laterality Date    BARIATRIC SURGERY      CHOLECYSTECTOMY       COLONOSCOPY  2021    GASTRIC BYPASS      LUMBAR DISC SURGERY      MANDIBLE SURGERY  12/1989    reconstruction surgery    REDUCTION MAMMAPLASTY      SPINE SURGERY  7/2009    Discectomy       Family History:   family history includes Alcohol abuse in her maternal grandfather, maternal grandmother, and mother; Anxiety disorder in her brother, father, and mother; Arthritis in her mother; Asthma in her mother; Cancer in her maternal grandmother; Depression in her mother and sister; Drug abuse in her brother and mother; Hyperlipidemia in her mother; Hypertension in her mother; Mental illness in her mother; Obesity in her mother, paternal aunt, and paternal uncle; Thyroid disease in her mother; Vision loss in her mother.   Otherwise pertinent FHx was reviewed and not pertinent to current issue.    Social History:    reports that she has never smoked. She has never been exposed to tobacco smoke. She has never used smokeless tobacco. She reports that she does not currently use alcohol. She reports that she does not currently use drugs after having used the following drugs: Oxycodone.    Medications:     Current Outpatient Medications:     aspirin 81 MG chewable tablet, Chew 1 tablet Daily., Disp: , Rfl:     atorvastatin (LIPITOR) 40 MG tablet, Take 1 tablet by mouth Every Night., Disp: , Rfl:     buPROPion XL (WELLBUTRIN XL) 300 MG 24 hr tablet, Take 1 tablet by mouth., Disp: , Rfl:     Etonogestrel (Nexplanon) 68 MG implant subdermal implant, To be inserted one time by prescriber. Route Subdermal., Disp: , Rfl:     levothyroxine (SYNTHROID, LEVOTHROID) 75 MCG tablet, TAKE 1 TABLET BY MOUTH EVERY DAY, Disp: 90 tablet, Rfl: 1    methenamine (HIPREX) 1 g tablet, Take 1 tablet by mouth 2 (Two) Times a Day With Meals., Disp: 90 tablet, Rfl: 1    Multiple Vitamins-Minerals (MULTIVITAMIN ADULT PO), Take  by mouth., Disp: , Rfl:     Semaglutide-Weight Management (Wegovy) 2.4 MG/0.75ML solution auto-injector, Inject 2.4 mg under the  skin into the appropriate area as directed 1 (One) Time Per Week., Disp: 2 mL, Rfl: 5    tiZANidine (ZANAFLEX) 4 MG tablet, , Disp: , Rfl:     Topiramate ER (Trokendi XR) 100 MG capsule sustained-release 24 hr, Take 1 capsule by mouth Daily., Disp: , Rfl:     traZODone (DESYREL) 50 MG tablet, Take 1 tablet by mouth every night at bedtime., Disp: , Rfl:     Ubrelvy 100 MG tablet, 1 tablet., Disp: , Rfl:     venlafaxine 225 MG tablet sustained-release 24 hour 24 hr tablet, Take 1 tablet by mouth Daily With Breakfast., Disp: 90 each, Rfl: 1    vitamin C (ASCORBIC ACID) 500 MG tablet, Take 1 tablet by mouth Daily., Disp: 90 tablet, Rfl: 1    Allergies:   No Known Allergies    Bladder & Bowel Symptom Questionnaire    How often do you usually urinate during the day ?   0 - No more often than once in 4 hours    2.   How many timed do you urinate at night?   0 - 0-1 time at night   3.   What is the reason that you usually urinate?   0 - Out of convenience (no urge)   4.   Once you get the urge to go, how long can you     comfortably delay?   0 - More than 60 min   5.   How often do you get a sudden urge that makes you rush to the bathroom?   1 - Rarely   6.   How often does a sudden urge to urinate result in you leaking urine or wetting pads?   0 - Never   7.  In your opinion, how good is your bladder control?   0 - Total control   8.  Do you have accidental bowel leakage?   no   9.  Do you have difficulty fully emptying your bladder?   no   10.  Do you experience accidental leakage when performing some physical activity such as coughing, sneezing, laughing or exercise?   no   11. Have you tried medications to help improve your symptoms?   yes   12. Would you be interested in learning about a long-lasting option that may help you with your symptoms?                                                                                Total Score   1     0-7 (Mild) 8-16 (Moderate) 17-28 (Severe)       Objective     Physical Exam:    Vital Signs:   Vitals:    11/07/24 1425   BP: 144/95   BP Location: Left arm   Patient Position: Sitting   Cuff Size: Adult   Pulse: 93   SpO2: 98%     There is no height or weight on file to calculate BMI.     Physical Exam  Vitals and nursing note reviewed.   Constitutional:       Appearance: Normal appearance.   HENT:      Head: Normocephalic and atraumatic.      Nose: Nose normal.      Mouth/Throat:      Mouth: Mucous membranes are moist.   Eyes:      Pupils: Pupils are equal, round, and reactive to light.   Pulmonary:      Effort: Pulmonary effort is normal.   Abdominal:      General: Abdomen is flat.      Palpations: Abdomen is soft.   Musculoskeletal:         General: Normal range of motion.      Cervical back: Normal range of motion.   Skin:     General: Skin is warm and dry.      Capillary Refill: Capillary refill takes less than 2 seconds.   Neurological:      General: No focal deficit present.      Mental Status: She is alert.   Psychiatric:         Mood and Affect: Mood normal.       Labs:   Brief Urine Lab Results  (Last result in the past 365 days)        Color   Clarity   Blood   Leuk Est   Nitrite   Protein   CREAT   Urine HCG        11/07/24 1431 Dark Yellow   Clear   Negative   Negative   Negative   Negative                   Urine Culture          6/25/2024    12:18 7/17/2024    10:45 8/14/2024    08:18   Urine Culture   Urine Culture <25,000 CFU/mL Normal Urogenital Jennie  >100,000 CFU/mL Escherichia coli ESBL  >100,000 CFU/mL Escherichia coli ESBL         Lab Results   Component Value Date    GLUCOSE 95 04/30/2024    CALCIUM 10.3 04/30/2024     04/30/2024    K 4.7 04/30/2024    CO2 22.4 04/30/2024     (H) 04/30/2024    BUN 19 04/30/2024    CREATININE 0.92 04/30/2024    EGFRIFNONA 64 09/28/2020    BCR 20.7 04/30/2024    ANIONGAP 13.6 04/30/2024       Lab Results   Component Value Date    WBC 5.53 04/30/2024    HGB 13.7 04/30/2024    HCT 43.1 04/30/2024    MCV 84.5 04/30/2024    PLT  "412 04/30/2024       No results found for: \"PSA\"    Images:   Mammo Screening Digital Tomosynthesis Bilateral With CAD    Result Date: 9/13/2024  Benign screening mammogram.  RECOMMENDATION:  Continue annual screening mammography.  BI-RADS CATEGORY 2, BENIGN.   CAD was utilized.  The standard false-negative rate of mammography is between 10% and 25%. Complex patterns or increased breast density will markedly elevate the false-negative rate of mammography.    A letter, in lay terminology, with the results of this exam will be mailed to the patient.  This report was finalized on 9/13/2024 2:55 PM by Dr. Jed Bedolla MD.        Measures:   Tobacco:   Michelle Davies  reports that she has never smoked. She has never been exposed to tobacco smoke. She has never used smokeless tobacco.          Urine Incontinence: Patient reports that she is not currently experiencing any symptoms of urinary incontinence.     Assessment / Plan      Assessment/Plan:   54 y.o. female who presented today for follow up of recurrent UTI. Urinalysis negative for infection or blood. She will begin Hiprex 1 g BID for UTI prophylaxis. F/U in 3 months for symptom check. If no further UTIs at that time, we will discontinue Hiprex.     Diagnoses and all orders for this visit:    1. Recurrent UTI (Primary)  -     POC Urinalysis Dipstick, Automated       Follow Up:   Return in about 3 months (around 2/7/2025).    I spent approximately 15 minutes providing clinical care for this patient; including review of patient's chart and provider documentation, face to face time spent with patient in examination room (obtaining history, performing physical exam, discussing diagnosis and management options), placing orders, and completing patient documentation.     FARHEEN Connors  [facility] [place]   "

## 2024-11-14 ENCOUNTER — OFFICE VISIT (OUTPATIENT)
Dept: FAMILY MEDICINE CLINIC | Facility: CLINIC | Age: 54
End: 2024-11-14
Payer: COMMERCIAL

## 2024-11-14 VITALS
HEIGHT: 65 IN | WEIGHT: 158.7 LBS | SYSTOLIC BLOOD PRESSURE: 110 MMHG | HEART RATE: 74 BPM | OXYGEN SATURATION: 98 % | BODY MASS INDEX: 26.44 KG/M2 | DIASTOLIC BLOOD PRESSURE: 80 MMHG

## 2024-11-14 DIAGNOSIS — Z98.84 GENERALIZED EXCESS AND REDUNDANT SKIN AFTER BARIATRIC SURGERY: ICD-10-CM

## 2024-11-14 DIAGNOSIS — R03.0 ELEVATED BLOOD PRESSURE READING: ICD-10-CM

## 2024-11-14 DIAGNOSIS — R21 RASH AND OTHER NONSPECIFIC SKIN ERUPTION: ICD-10-CM

## 2024-11-14 DIAGNOSIS — N39.0 FREQUENT UTI: Primary | ICD-10-CM

## 2024-11-14 DIAGNOSIS — L98.7 GENERALIZED EXCESS AND REDUNDANT SKIN AFTER BARIATRIC SURGERY: ICD-10-CM

## 2024-11-14 PROCEDURE — 99214 OFFICE O/P EST MOD 30 MIN: CPT | Performed by: STUDENT IN AN ORGANIZED HEALTH CARE EDUCATION/TRAINING PROGRAM

## 2024-11-14 NOTE — PROGRESS NOTES
Chief Complaint   Patient presents with    Urinary Tract Infection     F/U Pt. States she would like her Urine to be checked again to make sure.    Hypertension     BP has been elevated       HPI:  Michelle Davies is a 54 y.o. female who presents today for follow up of chronic issues below.     Has been following w Urology for recurrent UTIs. Started on Hipprex for prophylaxis. Has had no further UTI symptoms since. Would like urine rechecked today.     BP was elevated at the urology visit. Feels like its getting elevated during the middle of the day, really notices when she's stressed or anxious. Has mild headache, occasional heaviness in the chest. This is non exertional. Doesn't radiate.     Still having rash, skin irritation and odor under pannus and umbilicus. She did see Plastics and is considering panniculectomy.      PE:  Vitals:    11/14/24 0740   BP: 110/80   Pulse: 74   SpO2: 98%      Body mass index is 26.41 kg/m².    Gen Appearance: NAD  HEENT: Normocephalic, EOMI, no thyromegaly, trachea midline  Heart: RRR, normal S1 and S2, no murmur  Lungs: CTA b/l, no wheezing, no crackles  MSK: Moves all extremities well, normal gait, no peripheral edema  Neuro: No focal deficits    Current Outpatient Medications   Medication Sig Dispense Refill    aspirin 81 MG chewable tablet Chew 1 tablet Daily.      atorvastatin (LIPITOR) 40 MG tablet Take 1 tablet by mouth Every Night.      buPROPion XL (WELLBUTRIN XL) 300 MG 24 hr tablet Take 1 tablet by mouth.      Etonogestrel (Nexplanon) 68 MG implant subdermal implant To be inserted one time by prescriber. Route Subdermal.      levothyroxine (SYNTHROID, LEVOTHROID) 75 MCG tablet TAKE 1 TABLET BY MOUTH EVERY DAY 90 tablet 1    methenamine (HIPREX) 1 g tablet Take 1 tablet by mouth 2 (Two) Times a Day With Meals. 90 tablet 1    Multiple Vitamins-Minerals (MULTIVITAMIN ADULT PO) Take  by mouth.      Semaglutide-Weight Management (Wegovy) 2.4 MG/0.75ML solution auto-injector  Inject 2.4 mg under the skin into the appropriate area as directed 1 (One) Time Per Week. 2 mL 5    tiZANidine (ZANAFLEX) 4 MG tablet       Topiramate ER (Trokendi XR) 100 MG capsule sustained-release 24 hr Take 1 capsule by mouth Daily.      traZODone (DESYREL) 50 MG tablet Take 1 tablet by mouth every night at bedtime.      Ubrelvy 100 MG tablet 1 tablet.      venlafaxine 225 MG tablet sustained-release 24 hour 24 hr tablet Take 1 tablet by mouth Daily With Breakfast. 90 each 1    vitamin C (ASCORBIC ACID) 500 MG tablet Take 1 tablet by mouth Daily. 90 tablet 1     No current facility-administered medications for this visit.        A/P:  Diagnoses and all orders for this visit:    1. Frequent UTI (Primary)  UA is unremarkable today  Following w Urology. On Hipprex for prophylaxis.   Advised to maintain adequate hydration, avoid excessive caffeine intake  -     POCT urinalysis dipstick, automated    2. Elevated blood pressure reading  BP is normal range today. Some recent mild BP elevations at outside offices.  Discussed DASH diet, importance of optimizing sleep, physical activity and water intake. Additional information was provided in AVS.    3. Generalized excess and redundant skin after bariatric surgery  4. Rash and other nonspecific skin eruption  Now following w Plastics       Return in about 6 months (around 5/14/2025) for Annual.     Dictated Utilizing Dragon Dictation    Please note that portions of this note were completed with a voice recognition program.    Part of this note may be an electronic transcription/translation of spoken language to printed text using the Dragon Dictation System.

## 2024-12-09 NOTE — PROGRESS NOTES
Chief Complaint   Patient presents with    Nose Pain     Possible MRSA in left nostril.        HPI:  Michelle Davies is a 54 y.o. female who presents today for sinus pressure.    Answers submitted by the patient for this visit:  Primary Reason for Visit (Submitted on 12/9/2024)  What is the primary reason for your visit?: Problem Not Listed  Problem not listed (Submitted on 12/9/2024)  Chief Complaint: Other medical problem  Reason for appointment: Possible MRSA in nose  Other symptom: Nose pain and pain in left side of face  Onset: in the past 7 days  Chronicity: new  Frequency: constantly  Medications tried: Hydrogen peroxide  Additional information: Swelling in nose    Having left sided nasal pressure and some swelling over last few days. Had what she thought was an ingrown hair in her nose and pulled it out but swelling and pain have persisted. 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 Denies fever or chills.      PE:  Vitals:    12/10/24 0752   BP: 120/82   Pulse: 93   SpO2: 97%      Body mass index is 23.3 kg/m².    Gen Appearance: NAD  HEENT: Normocephalic, EOMI, exquisite tenderness in left maxillary sinus, no nasal polyps or open sores noted, TM's are normal appearing non bulging without effusion bilaterally  Lungs: Normal WOB  MSK: Moves all extremities well, normal gait, no peripheral edema  Neuro: No focal deficits    Current Outpatient Medications   Medication Sig Dispense Refill    aspirin 81 MG chewable tablet Chew 1 tablet Daily.      atorvastatin (LIPITOR) 40 MG tablet Take 1 tablet by mouth Every Night.      buPROPion XL (WELLBUTRIN XL) 300 MG 24 hr tablet Take 1 tablet by mouth.      Etonogestrel (Nexplanon) 68 MG implant subdermal implant To be inserted one time by prescriber. Route Subdermal.      levothyroxine (SYNTHROID, LEVOTHROID) 75 MCG tablet TAKE 1 TABLET BY  MOUTH EVERY DAY 90 tablet 1    methenamine (HIPREX) 1 g tablet Take 1 tablet by mouth 2 (Two) Times a Day With Meals. 90 tablet 1    Multiple Vitamins-Minerals (MULTIVITAMIN ADULT PO) Take  by mouth.      Semaglutide-Weight Management (Wegovy) 2.4 MG/0.75ML solution auto-injector Inject 2.4 mg under the skin into the appropriate area as directed 1 (One) Time Per Week. 2 mL 5    tiZANidine (ZANAFLEX) 4 MG tablet       Topiramate ER (Trokendi XR) 100 MG capsule sustained-release 24 hr Take 1 capsule by mouth Daily.      traZODone (DESYREL) 50 MG tablet Take 1 tablet by mouth every night at bedtime.      Ubrelvy 100 MG tablet 1 tablet.      venlafaxine 225 MG tablet sustained-release 24 hour 24 hr tablet Take 1 tablet by mouth Daily With Breakfast. 90 each 1    vitamin C (ASCORBIC ACID) 500 MG tablet Take 1 tablet by mouth Daily. 90 tablet 1    doxycycline (VIBRAMYCIN) 100 MG capsule Take 1 capsule by mouth 2 (Two) Times a Day for 7 days. 14 capsule 0     No current facility-administered medications for this visit.        A/P:  Diagnoses and all orders for this visit:    1. Acute non-recurrent maxillary sinusitis (Primary)  -     doxycycline (VIBRAMYCIN) 100 MG capsule; Take 1 capsule by mouth 2 (Two) Times a Day for 7 days.  Dispense: 14 capsule; Refill: 0       Exam findings c/w sinusitis, given MRSA hx and exquisite tenderness will go ahead and treat for possible bacterial etiology with Doxycycline  FU if no improvement within 2-3 days        Dictated Utilizing Dragon Dictation    Please note that portions of this note were completed with a voice recognition program.    Part of this note may be an electronic transcription/translation of spoken language to printed text using the Dragon Dictation System.

## 2024-12-10 ENCOUNTER — OFFICE VISIT (OUTPATIENT)
Dept: FAMILY MEDICINE CLINIC | Facility: CLINIC | Age: 54
End: 2024-12-10
Payer: COMMERCIAL

## 2024-12-10 VITALS
BODY MASS INDEX: 23.32 KG/M2 | WEIGHT: 140 LBS | SYSTOLIC BLOOD PRESSURE: 120 MMHG | HEIGHT: 65 IN | DIASTOLIC BLOOD PRESSURE: 82 MMHG | HEART RATE: 93 BPM | OXYGEN SATURATION: 97 %

## 2024-12-10 DIAGNOSIS — J01.00 ACUTE NON-RECURRENT MAXILLARY SINUSITIS: Primary | ICD-10-CM

## 2024-12-10 PROCEDURE — 99213 OFFICE O/P EST LOW 20 MIN: CPT | Performed by: STUDENT IN AN ORGANIZED HEALTH CARE EDUCATION/TRAINING PROGRAM

## 2024-12-10 RX ORDER — DOXYCYCLINE 100 MG/1
100 CAPSULE ORAL 2 TIMES DAILY
Qty: 14 CAPSULE | Refills: 0 | Status: SHIPPED | OUTPATIENT
Start: 2024-12-10 | End: 2024-12-17

## 2025-01-02 ENCOUNTER — PRIOR AUTHORIZATION (OUTPATIENT)
Dept: FAMILY MEDICINE CLINIC | Facility: CLINIC | Age: 55
End: 2025-01-02
Payer: COMMERCIAL

## 2025-01-02 NOTE — TELEPHONE ENCOUNTER
Key: [id number]) - [id number]  Wegovy 2.4MG/0.75ML auto-injectors  status: PA Request  Created: December 25th, 2024 [phone]  Sent: January 2nd, 2025    [employer] received a request for coverage of Wegovy (semaglutide injection) for  you.  Your plan has criteria in place for coverage of this medicine. We needed additional  clinical information from your prescriber in order to make a decision to either approve or  deny the request. We did not receive the additional clinical information within the time  allowed to make the decision; therefore, the request was denied. This is the initial  adverse determination for this request. The request was denied because:  Your plan only covers this drug when you experience benefits from taking the drug and  when your results from taking the drug are sent to us. Your doctor needs to send us all  of the following: A) Your weight prior to starting weight loss drug therapy, B) Your weight  now, and C) The dates your weights were taken. We have denied your request because  we did not receive all of your results. We reviewed the information we had. Your request  has been denied. Your doctor can send us any new or missing information for us to  review. For this drug, you may have to meet other criteria. You can request the drug  policy for more details. You can also request other plan documents for your review.  If you still want to ask for coverage for this medicine, you or your prescriber may submit  another request, along with the necessary clinical information, to [employer] by  mail, phone or fax to:    [employer] Prior Authorization (Commercial)  [facility] [place] [address] [place] [address]  Phone: [phone]  Fax: [phone]

## 2025-01-28 ENCOUNTER — HOSPITAL ENCOUNTER (OUTPATIENT)
Dept: GENERAL RADIOLOGY | Facility: HOSPITAL | Age: 55
Discharge: HOME | End: 2025-01-28
Payer: COMMERCIAL

## 2025-01-28 ENCOUNTER — LAB (OUTPATIENT)
Dept: LAB | Facility: HOSPITAL | Age: 55
End: 2025-01-28
Payer: COMMERCIAL

## 2025-01-28 ENCOUNTER — OFFICE VISIT (OUTPATIENT)
Dept: FAMILY MEDICINE CLINIC | Facility: CLINIC | Age: 55
End: 2025-01-28
Payer: COMMERCIAL

## 2025-01-28 VITALS
DIASTOLIC BLOOD PRESSURE: 76 MMHG | SYSTOLIC BLOOD PRESSURE: 128 MMHG | WEIGHT: 143.2 LBS | BODY MASS INDEX: 23.86 KG/M2 | HEIGHT: 65 IN | HEART RATE: 88 BPM | OXYGEN SATURATION: 97 %

## 2025-01-28 DIAGNOSIS — M79.674 TOE PAIN, RIGHT: ICD-10-CM

## 2025-01-28 DIAGNOSIS — R53.83 OTHER FATIGUE: ICD-10-CM

## 2025-01-28 DIAGNOSIS — R51.9 SINUS HEADACHE: ICD-10-CM

## 2025-01-28 DIAGNOSIS — R51.9 NONINTRACTABLE HEADACHE, UNSPECIFIED CHRONICITY PATTERN, UNSPECIFIED HEADACHE TYPE: ICD-10-CM

## 2025-01-28 DIAGNOSIS — E03.8 OTHER SPECIFIED HYPOTHYROIDISM: ICD-10-CM

## 2025-01-28 DIAGNOSIS — M79.674 TOE PAIN, RIGHT: Primary | ICD-10-CM

## 2025-01-28 LAB
BASOPHILS # BLD AUTO: 0.05 10*3/MM3 (ref 0–0.2)
BASOPHILS NFR BLD AUTO: 0.8 % (ref 0–1.5)
DEPRECATED RDW RBC AUTO: 39.8 FL (ref 37–54)
EOSINOPHIL # BLD AUTO: 0.43 10*3/MM3 (ref 0–0.4)
EOSINOPHIL NFR BLD AUTO: 6.5 % (ref 0.3–6.2)
ERYTHROCYTE [DISTWIDTH] IN BLOOD BY AUTOMATED COUNT: 12.8 % (ref 12.3–15.4)
HCT VFR BLD AUTO: 39 % (ref 34–46.6)
HGB BLD-MCNC: 12.3 G/DL (ref 12–15.9)
IMM GRANULOCYTES # BLD AUTO: 0.01 10*3/MM3 (ref 0–0.05)
IMM GRANULOCYTES NFR BLD AUTO: 0.2 % (ref 0–0.5)
LYMPHOCYTES # BLD AUTO: 2.77 10*3/MM3 (ref 0.7–3.1)
LYMPHOCYTES NFR BLD AUTO: 41.6 % (ref 19.6–45.3)
MCH RBC QN AUTO: 27.2 PG (ref 26.6–33)
MCHC RBC AUTO-ENTMCNC: 31.5 G/DL (ref 31.5–35.7)
MCV RBC AUTO: 86.1 FL (ref 79–97)
MONOCYTES # BLD AUTO: 0.59 10*3/MM3 (ref 0.1–0.9)
MONOCYTES NFR BLD AUTO: 8.9 % (ref 5–12)
NEUTROPHILS NFR BLD AUTO: 2.81 10*3/MM3 (ref 1.7–7)
NEUTROPHILS NFR BLD AUTO: 42 % (ref 42.7–76)
NRBC BLD AUTO-RTO: 0 /100 WBC (ref 0–0.2)
PLATELET # BLD AUTO: 402 10*3/MM3 (ref 140–450)
PMV BLD AUTO: 9.3 FL (ref 6–12)
RBC # BLD AUTO: 4.53 10*6/MM3 (ref 3.77–5.28)
WBC NRBC COR # BLD AUTO: 6.66 10*3/MM3 (ref 3.4–10.8)

## 2025-01-28 PROCEDURE — 99214 OFFICE O/P EST MOD 30 MIN: CPT | Performed by: STUDENT IN AN ORGANIZED HEALTH CARE EDUCATION/TRAINING PROGRAM

## 2025-01-28 PROCEDURE — 84443 ASSAY THYROID STIM HORMONE: CPT

## 2025-01-28 PROCEDURE — 84550 ASSAY OF BLOOD/URIC ACID: CPT

## 2025-01-28 PROCEDURE — 73660 X-RAY EXAM OF TOE(S): CPT

## 2025-01-28 PROCEDURE — 85025 COMPLETE CBC W/AUTO DIFF WBC: CPT

## 2025-01-28 RX ORDER — METHYLPREDNISOLONE 4 MG/1
TABLET ORAL
Qty: 21 TABLET | Refills: 0 | Status: SHIPPED | OUTPATIENT
Start: 2025-01-28

## 2025-01-28 RX ORDER — AMOXICILLIN 875 MG/1
875 TABLET, COATED ORAL 2 TIMES DAILY
Qty: 14 TABLET | Refills: 0 | Status: SHIPPED | OUTPATIENT
Start: 2025-01-28 | End: 2025-02-04

## 2025-01-28 RX ORDER — LEVOTHYROXINE SODIUM 75 UG/1
TABLET ORAL
Qty: 90 TABLET | Refills: 1 | Status: SHIPPED | OUTPATIENT
Start: 2025-01-28

## 2025-01-29 LAB
TSH SERPL DL<=0.05 MIU/L-ACNC: 1.21 UIU/ML (ref 0.27–4.2)
URATE SERPL-MCNC: 3 MG/DL (ref 2.4–5.7)

## 2025-01-30 NOTE — PROGRESS NOTES
Chief Complaint   Patient presents with    Foot Injury     Patient hasn't had any injury, right foot in pain       HPI:  Michelle Davies is a 54 y.o. female who presents today for right foot pain, fatigue, headache.     Pt just found out she lost her job this morning. Thought it may have been coming. She's been interviewing elsewhere.  Has been extremely tired, run down. Has a constant headache for the last 3-4 days and OTC meds aren't touching it. Worse with bending forward. Has been congested for sometime and does have h/o sinus infections.     Mainly came in today for right 5th toe pain. Has been going on for a few months but worsening. Each time she puts a shoe on there's pain to the lateral aspect of the toe. Now has pain between the toes, like a burning.       PE:  Vitals:    01/28/25 1111   BP: 128/76   Pulse: 88   SpO2: 97%      Body mass index is 23.83 kg/m².    Gen Appearance: NAD  HEENT: Normocephalic, PERRL, no thyromegaly, trachea midline  Heart: RRR, normal S1 and S2, no murmur  Lungs: CTA b/l, no wheezing, no crackles  MSK: Moves all extremities well, normal gait, no peripheral edema. There is faint redness to the right pinky toe, no swelling or deformity. There is a small skin tear between the 4th and 5th toes. Normal ROM. Some TTP on the lateral border of 5th toe.   Pulses: Palpable and equal b/l  Neuro: No focal deficits    Current Outpatient Medications   Medication Sig Dispense Refill    aspirin 81 MG chewable tablet Chew 1 tablet Daily.      atorvastatin (LIPITOR) 40 MG tablet Take 1 tablet by mouth Every Night.      buPROPion XL (WELLBUTRIN XL) 300 MG 24 hr tablet Take 1 tablet by mouth.      Etonogestrel (Nexplanon) 68 MG implant subdermal implant To be inserted one time by prescriber. Route Subdermal.      levothyroxine (SYNTHROID, LEVOTHROID) 75 MCG tablet TAKE 1 TABLET BY MOUTH EVERY DAY 90 tablet 1    methenamine (HIPREX) 1 g tablet Take 1 tablet by mouth 2 (Two) Times a Day With Meals. 90  tablet 1    Multiple Vitamins-Minerals (MULTIVITAMIN ADULT PO) Take  by mouth.      Semaglutide-Weight Management (Wegovy) 2.4 MG/0.75ML solution auto-injector Inject 2.4 mg under the skin into the appropriate area as directed 1 (One) Time Per Week. 2 mL 5    tiZANidine (ZANAFLEX) 4 MG tablet       Topiramate ER (Trokendi XR) 100 MG capsule sustained-release 24 hr Take 1 capsule by mouth Daily.      traZODone (DESYREL) 50 MG tablet Take 1 tablet by mouth every night at bedtime.      Ubrelvy 100 MG tablet 1 tablet.      venlafaxine 225 MG tablet sustained-release 24 hour 24 hr tablet Take 1 tablet by mouth Daily With Breakfast. 90 each 1    vitamin C (ASCORBIC ACID) 500 MG tablet Take 1 tablet by mouth Daily. 90 tablet 1    amoxicillin (AMOXIL) 875 MG tablet Take 1 tablet by mouth 2 (Two) Times a Day for 7 days. 14 tablet 0    methylPREDNISolone (MEDROL) 4 MG dose pack Take as directed on package instructions. 21 tablet 0     No current facility-administered medications for this visit.        A/P:  Diagnoses and all orders for this visit:    1. Toe pain, right (Primary)  Possibly due to compression from shoes. Advised to avoid anything tight across the distal foot, consider wide shoes. Use toe spacer between as this is causing some friction blistering.   Low suspicion for gout based on exam but uric acid level was drawn and this was normal.  -     XR Toe 2+ View Right; Future  -     Uric Acid; Future    2. Other fatigue  Pt presents w ongoing fatigue that is now interfering w daily life. Spent several minutes reviewing patient's sleep patterns, daily routine, diet and medical history. Explained that fatigue can often times be multifactorial in etiology. Will start with basic laboratory evaluation to rule out common secondary causes of fatigue. Could also be related to the sinus headache/infection which we will treat as below.   -     TSH Rfx On Abnormal To Free T4; Future  -     CBC & Differential; Future    3.  Nonintractable headache, unspecified chronicity pattern, unspecified headache type  4. Sinus headache  Trial steroids, Amoxicillin. Use steroidal nasal spray for symptom relief. Tylenol and Ibuprofen PRN.  -     methylPREDNISolone (MEDROL) 4 MG dose pack; Take as directed on package instructions.  Dispense: 21 tablet; Refill: 0  -     amoxicillin (AMOXIL) 875 MG tablet; Take 1 tablet by mouth 2 (Two) Times a Day for 7 days.  Dispense: 14 tablet; Refill: 0         Return if symptoms worsen or fail to improve.     Dictated Utilizing Dragon Dictation    Please note that portions of this note were completed with a voice recognition program.    Part of this note may be an electronic transcription/translation of spoken language to printed text using the Dragon Dictation System.  Answers submitted by the patient for this visit:  Lower Extremity Injury Questionnaire (Submitted on 1/27/2025)  Chief Complaint: Extremity pain  Injury: No  Pain location: right toes  Pain quality: burning, stabbing  Pain - numeric: 7/10  Progression since onset: worse  inability to bear weight: Yes  lower extremity swelling: Yes  Additional information: Hard growth under the skin

## 2025-02-27 ENCOUNTER — OFFICE VISIT (OUTPATIENT)
Dept: FAMILY MEDICINE CLINIC | Facility: CLINIC | Age: 55
End: 2025-02-27
Payer: COMMERCIAL

## 2025-02-27 VITALS
HEART RATE: 86 BPM | BODY MASS INDEX: 23.63 KG/M2 | SYSTOLIC BLOOD PRESSURE: 122 MMHG | DIASTOLIC BLOOD PRESSURE: 84 MMHG | OXYGEN SATURATION: 99 % | WEIGHT: 141.8 LBS | HEIGHT: 65 IN

## 2025-02-27 DIAGNOSIS — N89.8 VAGINAL DISCHARGE: ICD-10-CM

## 2025-02-27 DIAGNOSIS — R30.9 PAIN WITH URINATION: ICD-10-CM

## 2025-02-27 DIAGNOSIS — N76.0 ACUTE VAGINITIS: Primary | ICD-10-CM

## 2025-02-27 DIAGNOSIS — Z78.0 POST-MENOPAUSAL: ICD-10-CM

## 2025-02-27 LAB
BILIRUB BLD-MCNC: NEGATIVE MG/DL
CLARITY, POC: CLEAR
COLOR UR: YELLOW
EXPIRATION DATE: ABNORMAL
GLUCOSE UR STRIP-MCNC: NEGATIVE MG/DL
KETONES UR QL: NEGATIVE
LEUKOCYTE EST, POC: NEGATIVE
Lab: ABNORMAL
NITRITE UR-MCNC: NEGATIVE MG/ML
PH UR: 7 [PH] (ref 5–8)
PROT UR STRIP-MCNC: ABNORMAL MG/DL
RBC # UR STRIP: NEGATIVE /UL
SP GR UR: 1.01 (ref 1–1.03)
UROBILINOGEN UR QL: NORMAL

## 2025-02-27 PROCEDURE — 87086 URINE CULTURE/COLONY COUNT: CPT | Performed by: STUDENT IN AN ORGANIZED HEALTH CARE EDUCATION/TRAINING PROGRAM

## 2025-02-27 PROCEDURE — 87801 DETECT AGNT MULT DNA AMPLI: CPT | Performed by: STUDENT IN AN ORGANIZED HEALTH CARE EDUCATION/TRAINING PROGRAM

## 2025-02-27 PROCEDURE — 81003 URINALYSIS AUTO W/O SCOPE: CPT | Performed by: STUDENT IN AN ORGANIZED HEALTH CARE EDUCATION/TRAINING PROGRAM

## 2025-02-27 PROCEDURE — 87491 CHLMYD TRACH DNA AMP PROBE: CPT | Performed by: STUDENT IN AN ORGANIZED HEALTH CARE EDUCATION/TRAINING PROGRAM

## 2025-02-27 PROCEDURE — 87798 DETECT AGENT NOS DNA AMP: CPT | Performed by: STUDENT IN AN ORGANIZED HEALTH CARE EDUCATION/TRAINING PROGRAM

## 2025-02-27 PROCEDURE — 87661 TRICHOMONAS VAGINALIS AMPLIF: CPT | Performed by: STUDENT IN AN ORGANIZED HEALTH CARE EDUCATION/TRAINING PROGRAM

## 2025-02-27 PROCEDURE — 87591 N.GONORRHOEAE DNA AMP PROB: CPT | Performed by: STUDENT IN AN ORGANIZED HEALTH CARE EDUCATION/TRAINING PROGRAM

## 2025-02-27 PROCEDURE — 99213 OFFICE O/P EST LOW 20 MIN: CPT | Performed by: STUDENT IN AN ORGANIZED HEALTH CARE EDUCATION/TRAINING PROGRAM

## 2025-02-27 NOTE — PROGRESS NOTES
Chief Complaint   Patient presents with    Vaginal Discharge       HPI:  Michelle Davies is a 54 y.o. female with h/o frequent UTIs, prior CVA who presents today for above chief complaint.     Pt reports 2 week h/o vaginal discharge and abnormal odor. There is no itching. Does have pain with intercourse. Denies new sexual partners. No hematuria, fever or chills. She has noticed some low back pain so she was worried about another UTI- has h/o frequent episodes and has seen Urology for this last year. Was started on topical estrogen cream. States she hasn't used this in months. Was using intravaginal suppository and having issues with it causing excessive discharge.     PE:  Vitals:    02/27/25 0746   BP: 122/84   Pulse: 86   SpO2: 99%      Body mass index is 23.6 kg/m².    Gen Appearance: NAD  HEENT: Normocephalic, EOMI  Abdomen: Flat, no CVA TTP  Gyn: Labia minora and majora are somewhat beefy and erythematous  Urethra normal, patent, without lesions or discharge  Introitus without lesions or evidence of trauma  Vagina with thin white mucoid discharge, no lesions  Cervix without visible lesions, min friable with Pap collection  Neuro: No focal deficits    Current Outpatient Medications   Medication Sig Dispense Refill    aspirin 81 MG chewable tablet Chew 1 tablet Daily.      atorvastatin (LIPITOR) 40 MG tablet Take 1 tablet by mouth Every Night.      buPROPion XL (WELLBUTRIN XL) 300 MG 24 hr tablet Take 1 tablet by mouth.      Etonogestrel (Nexplanon) 68 MG implant subdermal implant To be inserted one time by prescriber. Route Subdermal.      levothyroxine (SYNTHROID, LEVOTHROID) 75 MCG tablet TAKE 1 TABLET BY MOUTH EVERY DAY 90 tablet 1    Multiple Vitamins-Minerals (MULTIVITAMIN ADULT PO) Take  by mouth.      tiZANidine (ZANAFLEX) 4 MG tablet       Topiramate ER (Trokendi XR) 100 MG capsule sustained-release 24 hr Take 1 capsule by mouth Daily.      traZODone (DESYREL) 50 MG tablet Take 1 tablet by mouth every night  at bedtime.      Ubrelvy 100 MG tablet 1 tablet.      venlafaxine 225 MG tablet sustained-release 24 hour 24 hr tablet Take 1 tablet by mouth Daily With Breakfast. 90 each 1    vitamin C (ASCORBIC ACID) 500 MG tablet Take 1 tablet by mouth Daily. 90 tablet 1     No current facility-administered medications for this visit.        A/P:  Diagnoses and all orders for this visit:    1. Acute vaginitis (Primary)  -     NuSwab VG+ - Swab, Vagina; Future    2. Post-menopausal    3. Pain with urination  -     POC Urinalysis Dipstick, Automated  -     NuSwab VG+ - Swab, Vagina; Future  -     Urine Culture - Urine, Urine, Clean Catch; Future    4. Vaginal discharge  -     NuSwab VG+ - Swab, Vagina; Future       Suspect vaginitis possibly triggered by estrogen withdrawal, there is some atrophy noted of the vaginal tissue on exam today. Discharge appears physiologic but Nuswab sent to r/o infection. UA was unremarkable but will send for culture given the abnormal odor and low back pain. I have advised she restarted her topical estrogen again and see if this helps with symptoms and dyspareunia.     Return if symptoms worsen or fail to improve.     Dictated Utilizing Dragon Dictation    Please note that portions of this note were completed with a voice recognition program.    Part of this note may be an electronic transcription/translation of spoken language to printed text using the Dragon Dictation System.

## 2025-02-28 ENCOUNTER — PATIENT MESSAGE (OUTPATIENT)
Dept: FAMILY MEDICINE CLINIC | Facility: CLINIC | Age: 55
End: 2025-02-28
Payer: COMMERCIAL

## 2025-02-28 DIAGNOSIS — N39.0 FREQUENT UTI: Primary | ICD-10-CM

## 2025-02-28 LAB — BACTERIA SPEC AEROBE CULT: ABNORMAL

## 2025-03-03 LAB
A VAGINAE DNA VAG QL NAA+PROBE: ABNORMAL SCORE
BVAB2 DNA VAG QL NAA+PROBE: ABNORMAL SCORE
C ALBICANS DNA VAG QL NAA+PROBE: NEGATIVE
C GLABRATA DNA VAG QL NAA+PROBE: NEGATIVE
C TRACH DNA SPEC QL NAA+PROBE: NEGATIVE
MEGA1 DNA VAG QL NAA+PROBE: ABNORMAL SCORE
N GONORRHOEA DNA VAG QL NAA+PROBE: NEGATIVE
T VAGINALIS DNA VAG QL NAA+PROBE: NEGATIVE

## 2025-03-03 RX ORDER — ESTRADIOL 0.1 MG/G
1 CREAM VAGINAL 2 TIMES WEEKLY
Qty: 42.5 G | Refills: 3 | Status: SHIPPED | OUTPATIENT
Start: 2025-03-03

## 2025-03-18 ENCOUNTER — PATIENT MESSAGE (OUTPATIENT)
Dept: FAMILY MEDICINE CLINIC | Facility: CLINIC | Age: 55
End: 2025-03-18
Payer: COMMERCIAL

## 2025-03-18 DIAGNOSIS — Z98.84 HISTORY OF BARIATRIC SURGERY: ICD-10-CM

## 2025-03-18 DIAGNOSIS — Z86.39 HISTORY OF OBESITY: Primary | ICD-10-CM

## 2025-03-20 RX ORDER — SEMAGLUTIDE 0.25 MG/.5ML
0.25 INJECTION, SOLUTION SUBCUTANEOUS WEEKLY
Qty: 2 ML | Refills: 3 | Status: SHIPPED | OUTPATIENT
Start: 2025-03-20

## 2025-03-21 ENCOUNTER — OFFICE VISIT (OUTPATIENT)
Dept: FAMILY MEDICINE CLINIC | Facility: CLINIC | Age: 55
End: 2025-03-21
Payer: COMMERCIAL

## 2025-03-21 VITALS
SYSTOLIC BLOOD PRESSURE: 134 MMHG | DIASTOLIC BLOOD PRESSURE: 86 MMHG | BODY MASS INDEX: 24.39 KG/M2 | HEART RATE: 106 BPM | WEIGHT: 146.4 LBS | OXYGEN SATURATION: 96 % | HEIGHT: 65 IN

## 2025-03-21 DIAGNOSIS — U07.1 COVID: Primary | ICD-10-CM

## 2025-03-21 LAB
EXPIRATION DATE: ABNORMAL
FLUAV AG UPPER RESP QL IA.RAPID: NOT DETECTED
FLUBV AG UPPER RESP QL IA.RAPID: NOT DETECTED
INTERNAL CONTROL: ABNORMAL
Lab: ABNORMAL
SARS-COV-2 AG UPPER RESP QL IA.RAPID: DETECTED

## 2025-03-21 PROCEDURE — 99213 OFFICE O/P EST LOW 20 MIN: CPT | Performed by: STUDENT IN AN ORGANIZED HEALTH CARE EDUCATION/TRAINING PROGRAM

## 2025-03-21 PROCEDURE — 87428 SARSCOV & INF VIR A&B AG IA: CPT | Performed by: STUDENT IN AN ORGANIZED HEALTH CARE EDUCATION/TRAINING PROGRAM

## 2025-03-21 RX ORDER — BENZONATATE 200 MG/1
200 CAPSULE ORAL 3 TIMES DAILY PRN
Qty: 30 CAPSULE | Refills: 0 | Status: SHIPPED | OUTPATIENT
Start: 2025-03-21 | End: 2025-03-31

## 2025-03-24 ENCOUNTER — PRIOR AUTHORIZATION (OUTPATIENT)
Dept: FAMILY MEDICINE CLINIC | Facility: CLINIC | Age: 55
End: 2025-03-24
Payer: COMMERCIAL

## 2025-03-24 NOTE — TELEPHONE ENCOUNTER
(Key: [id number]) - [id number]  Wegovy 0.25MG/0.5ML auto-injectors  status: PA RequestCreated: March 20th, 2025 [phone]: March 24th, 2025    As long as you remain covered by your prescription drug plan and there are no changes to your  plan benefits, this request is approved from 03/25/2025 to 03/25/2026.

## 2025-03-28 NOTE — PROGRESS NOTES
Chief Complaint   Patient presents with    Exposure To Known Illness       HPI:  Michelle Davies is a 54 y.o. female who presents today for URI symptoms.     Few day h/o congestion, cough, general malaise. Took home COVID test this morning which was positive. Work wanted her to have an in office test to confirm. Several people at her workplace have COVID. Denies fever, shortness of breath.      PE:  Vitals:    03/21/25 1501   BP: 134/86   Pulse: 106   SpO2: 96%      Body mass index is 24.36 kg/m².    Gen Appearance: NAD  HEENT: Normocephalic, EOMI, no thyromegaly, trachea midline  Heart: RRR, normal S1 and S2, no murmur  Lungs: CTA b/l, no wheezing, no crackles  MSK: Moves all extremities well, normal gait, no peripheral edema  Neuro: No focal deficits    Current Outpatient Medications   Medication Sig Dispense Refill    aspirin 81 MG chewable tablet Chew 1 tablet Daily.      atorvastatin (LIPITOR) 40 MG tablet Take 1 tablet by mouth Every Night.      buPROPion XL (WELLBUTRIN XL) 300 MG 24 hr tablet Take 1 tablet by mouth.      estradiol (ESTRACE VAGINAL) 0.1 MG/GM vaginal cream Insert 1 g into the vagina 2 (Two) Times a Week. 42.5 g 3    Etonogestrel (Nexplanon) 68 MG implant subdermal implant To be inserted one time by prescriber. Route Subdermal.      levothyroxine (SYNTHROID, LEVOTHROID) 75 MCG tablet TAKE 1 TABLET BY MOUTH EVERY DAY 90 tablet 1    Multiple Vitamins-Minerals (MULTIVITAMIN ADULT PO) Take  by mouth.      Semaglutide-Weight Management (Wegovy) 0.25 MG/0.5ML solution auto-injector Inject 0.5 mL under the skin into the appropriate area as directed 1 (One) Time Per Week. 2 mL 3    tiZANidine (ZANAFLEX) 4 MG tablet       Topiramate ER (Trokendi XR) 100 MG capsule sustained-release 24 hr Take 1 capsule by mouth Daily.      traZODone (DESYREL) 50 MG tablet Take 1 tablet by mouth every night at bedtime.      Ubrelvy 100 MG tablet 1 tablet.      venlafaxine 225 MG tablet sustained-release 24 hour 24 hr tablet  Take 1 tablet by mouth Daily With Breakfast. 90 each 1    vitamin C (ASCORBIC ACID) 500 MG tablet Take 1 tablet by mouth Daily. 90 tablet 1    benzonatate (TESSALON) 200 MG capsule Take 1 capsule by mouth 3 (Three) Times a Day As Needed for Cough for up to 10 days. 30 capsule 0     No current facility-administered medications for this visit.        A/P:  Assessment & Plan  COVID  POC testing positive for COVID  Reviewed updated RTW guidelines- can return per employer guidelines once symptoms have improved/resolved and fever free (without use of medication) for 24 hours. Continue to mask for 5 days to prevent spread.   Tessalon pearls sent for cough  FU if sx's worsen or fail to improve    Orders:    POCT SARS-CoV-2 Antigen JOHNNIE + Flu    benzonatate (TESSALON) 200 MG capsule; Take 1 capsule by mouth 3 (Three) Times a Day As Needed for Cough for up to 10 days.            Dictated Utilizing Dragon Dictation    Please note that portions of this note were completed with a voice recognition program.    Part of this note may be an electronic transcription/translation of spoken language to printed text using the Dragon Dictation System.

## 2025-04-16 ENCOUNTER — OFFICE VISIT (OUTPATIENT)
Dept: FAMILY MEDICINE CLINIC | Facility: CLINIC | Age: 55
End: 2025-04-16
Payer: COMMERCIAL

## 2025-04-16 VITALS
HEIGHT: 65 IN | OXYGEN SATURATION: 100 % | BODY MASS INDEX: 24.99 KG/M2 | DIASTOLIC BLOOD PRESSURE: 86 MMHG | WEIGHT: 150 LBS | SYSTOLIC BLOOD PRESSURE: 124 MMHG | HEART RATE: 88 BPM

## 2025-04-16 DIAGNOSIS — R05.8 POST-VIRAL COUGH SYNDROME: Primary | ICD-10-CM

## 2025-04-16 NOTE — PROGRESS NOTES
Chief Complaint   Patient presents with    Cough     Ongoing cough, PT states some days it feels like it moves to her chest        HPI:  Michelle Davies is a 54 y.o. female who presents today for cough, SOB.     PT was diagnosed with COVID on 3/28.  States that the majority of her symptoms have resolved apart from residual cough.  Cough is worse at night.  Can be so severe that she has stooled herself a couple of times unknowingly in the middle of the night and will wake herself up coughing.  She has been taking Tessalon Perles with symptoms are more severe.  She denies any shortness of breath or wheezing.  Cough is mostly dry.    PE:  Vitals:    04/16/25 0749   BP: 124/86   Pulse: 88   SpO2: 100%      Body mass index is 24.96 kg/m².    Gen Appearance: NAD  HEENT: Normocephalic, EOMI, small amount of fluid noted behind right TM which is nonbulging, nonerythematous.  Left TM appears normal.  Oropharyngeal exudates or lesions.  Heart: RRR, normal S1 and S2, no murmur  Lungs: CTA b/l, no wheezing, no crackles  MSK: Moves all extremities well, normal gait, no peripheral edema  Neuro: No focal deficits    Current Outpatient Medications   Medication Sig Dispense Refill    aspirin 81 MG chewable tablet Chew 1 tablet Daily.      atorvastatin (LIPITOR) 40 MG tablet Take 1 tablet by mouth Every Night.      buPROPion XL (WELLBUTRIN XL) 300 MG 24 hr tablet Take 1 tablet by mouth.      estradiol (ESTRACE VAGINAL) 0.1 MG/GM vaginal cream Insert 1 g into the vagina 2 (Two) Times a Week. 42.5 g 3    Etonogestrel (Nexplanon) 68 MG implant subdermal implant To be inserted one time by prescriber. Route Subdermal.      levothyroxine (SYNTHROID, LEVOTHROID) 75 MCG tablet TAKE 1 TABLET BY MOUTH EVERY DAY 90 tablet 1    Multiple Vitamins-Minerals (MULTIVITAMIN ADULT PO) Take  by mouth.      Semaglutide-Weight Management (Wegovy) 0.25 MG/0.5ML solution auto-injector Inject 0.5 mL under the skin into the appropriate area as directed 1 (One)  Time Per Week. 2 mL 3    tiZANidine (ZANAFLEX) 4 MG tablet       Topiramate ER (Trokendi XR) 100 MG capsule sustained-release 24 hr Take 1 capsule by mouth Daily.      traZODone (DESYREL) 50 MG tablet Take 1 tablet by mouth every night at bedtime.      Ubrelvy 100 MG tablet 1 tablet.      venlafaxine 225 MG tablet sustained-release 24 hour 24 hr tablet Take 1 tablet by mouth Daily With Breakfast. 90 each 1    vitamin C (ASCORBIC ACID) 500 MG tablet Take 1 tablet by mouth Daily. 90 tablet 1     No current facility-administered medications for this visit.        A/P:  Assessment & Plan  Post-viral cough syndrome  Patient presents with persistent cough (nonproductive) after COVID infection.  Her pulmonary exam is unremarkable today, lungs are clear.  Oxygen level is 100%.  Likely postviral cough.  Counseled patient on expected course which may last up to 6 weeks after infection.  Advised symptomatic treatment with OTC cough medication, Flonase to help with drainage.  If symptoms worsen could consider steroid burst in the future.  Patient may call/message if she would like to trial this.           Return if symptoms worsen or fail to improve.     Dictated Utilizing Dragon Dictation    Please note that portions of this note were completed with a voice recognition program.    Part of this note may be an electronic transcription/translation of spoken language to printed text using the Dragon Dictation System.

## 2025-05-14 ENCOUNTER — OFFICE VISIT (OUTPATIENT)
Dept: FAMILY MEDICINE CLINIC | Facility: CLINIC | Age: 55
End: 2025-05-14
Payer: COMMERCIAL

## 2025-05-14 VITALS
SYSTOLIC BLOOD PRESSURE: 124 MMHG | HEART RATE: 97 BPM | OXYGEN SATURATION: 99 % | HEIGHT: 65 IN | BODY MASS INDEX: 23.49 KG/M2 | DIASTOLIC BLOOD PRESSURE: 80 MMHG | WEIGHT: 141 LBS

## 2025-05-14 DIAGNOSIS — N39.0 RECURRENT UTI: ICD-10-CM

## 2025-05-14 DIAGNOSIS — Z01.419 ENCOUNTER FOR GYNECOLOGICAL EXAMINATION WITHOUT ABNORMAL FINDING: ICD-10-CM

## 2025-05-14 DIAGNOSIS — N89.8 VAGINAL ODOR: ICD-10-CM

## 2025-05-14 DIAGNOSIS — Z00.00 ANNUAL PHYSICAL EXAM: Primary | ICD-10-CM

## 2025-05-14 PROCEDURE — 87798 DETECT AGENT NOS DNA AMP: CPT | Performed by: STUDENT IN AN ORGANIZED HEALTH CARE EDUCATION/TRAINING PROGRAM

## 2025-05-14 PROCEDURE — 87801 DETECT AGNT MULT DNA AMPLI: CPT | Performed by: STUDENT IN AN ORGANIZED HEALTH CARE EDUCATION/TRAINING PROGRAM

## 2025-05-14 PROCEDURE — 87661 TRICHOMONAS VAGINALIS AMPLIF: CPT | Performed by: STUDENT IN AN ORGANIZED HEALTH CARE EDUCATION/TRAINING PROGRAM

## 2025-05-14 PROCEDURE — 87491 CHLMYD TRACH DNA AMP PROBE: CPT | Performed by: STUDENT IN AN ORGANIZED HEALTH CARE EDUCATION/TRAINING PROGRAM

## 2025-05-14 PROCEDURE — 87591 N.GONORRHOEAE DNA AMP PROB: CPT | Performed by: STUDENT IN AN ORGANIZED HEALTH CARE EDUCATION/TRAINING PROGRAM

## 2025-05-14 NOTE — PROGRESS NOTES
Female Physical Note      Date: 2025   Patient Name: Michelle Davies  : 1970   MRN: [id number]     Chief Complaint:    Chief Complaint   Patient presents with    Frequent UTI       History of Present Illness: Michelle Davies is a 54 y.o. female who is here today for their annual health maintenance and physical.    HPI  H/o recurrent UTI's. Was referred to urology for this last year.  Started on Estrace vaginal cream and symptoms improved.  She denies any recent infections.  She denies pelvic pain, excessive vaginal dryness or discharge.  She has recently noticed vaginal odor.  Has history of BV and requests new swab today.  Had Pap smear last year which showed normal cytology.  Will need repeat in 3 years as no HPV was sent.    Hypothyroidims- Doing well on levothyroxine 75mcg.     H/o CVA, Chronic Headaches- Follows w Neurology.     On Wegovy Rx'd through weight loss clinic.  Restarted from her last visit, on lowest maintenance dose.  Feels well on this dose, denies any adverse effects.    She is seeing a new male partner.  New job is going well!    Subjective      Review of Systems:   As noted above in HPI    Past Medical History, Social History, Family History and Care Team were all reviewed with patient and updated as appropriate.     Medications:     Current Outpatient Medications:     aspirin 81 MG chewable tablet, Chew 1 tablet Daily., Disp: , Rfl:     atorvastatin (LIPITOR) 40 MG tablet, Take 1 tablet by mouth Every Night., Disp: , Rfl:     buPROPion XL (WELLBUTRIN XL) 300 MG 24 hr tablet, Take 1 tablet by mouth., Disp: , Rfl:     estradiol (ESTRACE VAGINAL) 0.1 MG/GM vaginal cream, Insert 1 g into the vagina 2 (Two) Times a Week., Disp: 42.5 g, Rfl: 3    Etonogestrel (Nexplanon) 68 MG implant subdermal implant, To be inserted one time by prescriber. Route Subdermal., Disp: , Rfl:     levothyroxine (SYNTHROID, LEVOTHROID) 75 MCG tablet, TAKE 1 TABLET BY MOUTH EVERY DAY, Disp: 90 tablet, Rfl: 1     Multiple Vitamins-Minerals (MULTIVITAMIN ADULT PO), Take  by mouth., Disp: , Rfl:     Semaglutide-Weight Management (Wegovy) 0.25 MG/0.5ML solution auto-injector, Inject 0.5 mL under the skin into the appropriate area as directed 1 (One) Time Per Week., Disp: 2 mL, Rfl: 3    tiZANidine (ZANAFLEX) 4 MG tablet, , Disp: , Rfl:     Topiramate ER (Trokendi XR) 100 MG capsule sustained-release 24 hr, Take 1 capsule by mouth Daily., Disp: , Rfl:     traZODone (DESYREL) 50 MG tablet, Take 1 tablet by mouth every night at bedtime., Disp: , Rfl:     Ubrelvy 100 MG tablet, 1 tablet., Disp: , Rfl:     venlafaxine 225 MG tablet sustained-release 24 hour 24 hr tablet, Take 1 tablet by mouth Daily With Breakfast., Disp: 90 each, Rfl: 1    vitamin C (ASCORBIC ACID) 500 MG tablet, Take 1 tablet by mouth Daily., Disp: 90 tablet, Rfl: 1    Allergies:   No Known Allergies    Immunizations:  Pneumonia: Declines  Immunization History   Administered Date(s) Administered    COVID-19 (MODERNA) 1st,2nd,3rd Dose Monovalent 01/06/2021, 02/04/2021    Fluzone (or Fluarix & Flulaval for VFC) >6mos 09/29/2020, 11/11/2022    Hep B, Dialysis 01/01/2020    Shingrix 01/11/2023    Tdap 07/22/2020       Colorectal Screening:   Patient reports up-to-date.  Records have been requested from [facility].  Last Completed Colonoscopy    This patient has no relevant Health Maintenance data.       Pap: UTD, normal in 2024  Last Completed Pap Smear            Upcoming       PAP SMEAR (Every 3 Years) Next due on 5/17/2027 05/17/2024  LIQUID-BASED PAP SMEAR WITH HPV GENOTYPING IF ASCUS ([facility])                           Mammogram:  Due September 2025   Last Completed Mammogram            Completed or No Longer Recommended       MAMMOGRAM  Discontinued        Frequency changed to Never automatically (Topic No Longer Applies)    09/12/2024  Mammo Screening Digital Tomosynthesis Bilateral With CAD                             CT for Smoker (Age 50-80,  "20 pk yr): Never smoker  Bone Density/DEXA (Age 65 or high risk): DEXA scan to be done at 65      Tobacco Use: Low Risk  (5/14/2025)    Patient History     Smoking Tobacco Use: Never     Smokeless Tobacco Use: Never     Passive Exposure: Never       Social History     Substance and Sexual Activity   Alcohol Use Not Currently    Comment: Socially but haven’t had anything since May 2024        Social History     Substance and Sexual Activity   Drug Use Not Currently    Types: Oxycodone    Comment: No use since 1/27/2004       Objective     Physical Exam:  Vital Signs:   Vitals:    05/14/25 0753   BP: 124/80   BP Location: Right arm   Patient Position: Sitting   Cuff Size: Adult   Pulse: 97   SpO2: 99%   Weight: 64 kg (141 lb)   Height: 165.1 cm (65\")     Body mass index is 23.46 kg/m².     Physical Exam  Constitutional:       Appearance: She is normal weight. She is not ill-appearing.   HENT:      Head: Normocephalic and atraumatic.      Right Ear: Tympanic membrane normal.      Left Ear: Tympanic membrane normal.      Mouth/Throat:      Mouth: Mucous membranes are moist.      Pharynx: Oropharynx is clear. No oropharyngeal exudate or posterior oropharyngeal erythema.   Eyes:      Extraocular Movements: Extraocular movements intact.      Conjunctiva/sclera: Conjunctivae normal.   Cardiovascular:      Rate and Rhythm: Normal rate and regular rhythm.      Heart sounds: Normal heart sounds.   Pulmonary:      Breath sounds: Normal breath sounds. No wheezing or rhonchi.   Abdominal:      General: Abdomen is flat.      Palpations: Abdomen is soft.      Tenderness: There is no abdominal tenderness.   Genitourinary:     Comments: Labia minora and majora without lesions or rash  Urethra normal, patent, without lesions or discharge  Introitus without lesions or evidence of trauma  Vagina with scant white mucoid discharge, no lesions  Cervix without visible lesions  Perineum intact without lesions  Anus without hemorrhoids or " lesions  Musculoskeletal:         General: Normal range of motion.      Cervical back: Normal range of motion and neck supple.   Lymphadenopathy:      Cervical: No cervical adenopathy.   Neurological:      General: No focal deficit present.      Mental Status: She is alert.   Psychiatric:         Mood and Affect: Mood normal.         Thought Content: Thought content normal.           Assessment / Plan      Assessment/Plan:   Assessment & Plan  Annual physical exam  Age-appropriate screenings recommendations reviewed  Pap collected 2024 without HPV cotesting, normal, repeat in 2027  GYN exam today normal  Labs were done in January, deferred today  Will need lipid panel soon  Mammogram due September 2025  Reports colonoscopy is UTD, records have been requested from [facility]  Immunizations reviewed.  Declines pneumococcal vaccination.  Tdap is UTD.       Encounter for gynecological examination without abnormal finding  Continue topical estrogen cream       Vaginal odor    Orders:    NuSwab VG+ - Swab, Vagina; Future    Recurrent UTI  Improved           Healthcare Maintenance:  Counseling provided based on age appropriate USPSTF guidelines. Preventive counseling and anticipatory guidance discussed on the following topics: nutrition, physical activity, healthy weight, alcohol and drugs, sexual behavior and STDs, mental health, immunizations, and screenings    Michelle Davies voices understanding and acceptance of this advice and will call back with any further questions or concerns. AVS with preventive healthcare tips printed for patient.         Follow Up:   Return if symptoms worsen or fail to improve.          Judith Ochoa DO  [facility] [place] [address]

## 2025-05-16 RX ORDER — VENLAFAXINE HYDROCHLORIDE 225 MG/1
225 TABLET, EXTENDED RELEASE ORAL
Qty: 90 EACH | Refills: 1 | Status: SHIPPED | OUTPATIENT
Start: 2025-05-16

## 2025-05-16 NOTE — TELEPHONE ENCOUNTER
Rx Refill Note  Requested Prescriptions     Pending Prescriptions Disp Refills    venlafaxine 225 MG tablet sustained-release 24 hour 24 hr tablet 90 each 1     Sig: Take 1 tablet by mouth Daily With Breakfast.      Last office visit with prescribing clinician: 5/14/2025   Last telemedicine visit with prescribing clinician: Visit date not found   Next office visit with prescribing clinician: Visit date not found                         Would you like a call back once the refill request has been completed: [] Yes [] No    If the office needs to give you a call back, can they leave a voicemail: [] Yes [] No    Carmen Ann MA  05/16/25, 08:05 EDT

## 2025-08-28 ENCOUNTER — OFFICE VISIT (OUTPATIENT)
Dept: FAMILY MEDICINE CLINIC | Facility: CLINIC | Age: 55
End: 2025-08-28
Payer: COMMERCIAL

## 2025-08-28 VITALS
BODY MASS INDEX: 25.39 KG/M2 | WEIGHT: 152.4 LBS | SYSTOLIC BLOOD PRESSURE: 150 MMHG | OXYGEN SATURATION: 98 % | HEIGHT: 65 IN | HEART RATE: 88 BPM | DIASTOLIC BLOOD PRESSURE: 97 MMHG

## 2025-08-28 DIAGNOSIS — G89.29 CHRONIC RIGHT SHOULDER PAIN: Primary | ICD-10-CM

## 2025-08-28 DIAGNOSIS — M25.511 CHRONIC RIGHT SHOULDER PAIN: Primary | ICD-10-CM

## 2025-08-28 DIAGNOSIS — E78.5 DYSLIPIDEMIA, GOAL LDL BELOW 100: ICD-10-CM

## 2025-08-28 DIAGNOSIS — Z12.11 ENCOUNTER FOR SCREENING FOR MALIGNANT NEOPLASM OF COLON: ICD-10-CM

## 2025-08-28 DIAGNOSIS — Z86.39 HISTORY OF OBESITY: ICD-10-CM

## 2025-08-28 DIAGNOSIS — Z98.84 HISTORY OF BARIATRIC SURGERY: ICD-10-CM

## 2025-08-28 PROBLEM — G43.719 INTRACTABLE CHRONIC MIGRAINE WITHOUT AURA AND WITHOUT STATUS MIGRAINOSUS: Status: ACTIVE | Noted: 2023-01-23

## 2025-08-28 PROBLEM — I63.9 CEREBROVASCULAR ACCIDENT: Status: ACTIVE | Noted: 2021-11-01

## 2025-08-28 PROBLEM — I10 HYPERTENSION: Status: ACTIVE | Noted: 2021-07-09

## 2025-08-28 PROBLEM — F41.9 ANXIETY: Status: ACTIVE | Noted: 2022-11-11

## 2025-08-28 PROBLEM — F41.8 MIXED ANXIETY AND DEPRESSIVE DISORDER: Status: ACTIVE | Noted: 2023-01-11

## 2025-08-28 PROBLEM — J45.909 ASTHMA: Status: ACTIVE | Noted: 2025-08-28

## 2025-08-28 PROBLEM — G47.01 INSOMNIA DUE TO MEDICAL CONDITION: Status: ACTIVE | Noted: 2023-01-23

## 2025-08-28 RX ORDER — SEMAGLUTIDE 0.5 MG/.5ML
0.5 INJECTION, SOLUTION SUBCUTANEOUS WEEKLY
Qty: 6 ML | Refills: 3 | Status: SHIPPED | OUTPATIENT
Start: 2025-08-28